# Patient Record
Sex: FEMALE | Race: WHITE | NOT HISPANIC OR LATINO | URBAN - METROPOLITAN AREA
[De-identification: names, ages, dates, MRNs, and addresses within clinical notes are randomized per-mention and may not be internally consistent; named-entity substitution may affect disease eponyms.]

---

## 2017-01-24 ENCOUNTER — OUTPATIENT (OUTPATIENT)
Dept: OUTPATIENT SERVICES | Facility: HOSPITAL | Age: 62
LOS: 1 days | End: 2017-01-24
Payer: COMMERCIAL

## 2017-01-24 ENCOUNTER — APPOINTMENT (OUTPATIENT)
Dept: ULTRASOUND IMAGING | Facility: CLINIC | Age: 62
End: 2017-01-24

## 2017-01-24 ENCOUNTER — APPOINTMENT (OUTPATIENT)
Dept: MAMMOGRAPHY | Facility: CLINIC | Age: 62
End: 2017-01-24

## 2017-01-24 DIAGNOSIS — C50.911 MALIGNANT NEOPLASM OF UNSPECIFIED SITE OF RIGHT FEMALE BREAST: ICD-10-CM

## 2017-01-24 DIAGNOSIS — Z00.00 ENCOUNTER FOR GENERAL ADULT MEDICAL EXAMINATION WITHOUT ABNORMAL FINDINGS: ICD-10-CM

## 2017-01-24 DIAGNOSIS — N63 UNSPECIFIED LUMP IN BREAST: Chronic | ICD-10-CM

## 2017-01-24 DIAGNOSIS — Z98.89 OTHER SPECIFIED POSTPROCEDURAL STATES: Chronic | ICD-10-CM

## 2017-01-24 PROCEDURE — 76641 ULTRASOUND BREAST COMPLETE: CPT

## 2017-01-24 PROCEDURE — 77067 SCR MAMMO BI INCL CAD: CPT

## 2017-01-24 PROCEDURE — 77063 BREAST TOMOSYNTHESIS BI: CPT

## 2017-08-10 ENCOUNTER — EMERGENCY (EMERGENCY)
Facility: HOSPITAL | Age: 62
LOS: 1 days | Discharge: HOME | End: 2017-08-10
Attending: EMERGENCY MEDICINE
Payer: COMMERCIAL

## 2017-08-10 VITALS
DIASTOLIC BLOOD PRESSURE: 80 MMHG | SYSTOLIC BLOOD PRESSURE: 122 MMHG | RESPIRATION RATE: 16 BRPM | HEART RATE: 68 BPM | OXYGEN SATURATION: 99 %

## 2017-08-10 VITALS
HEIGHT: 61 IN | DIASTOLIC BLOOD PRESSURE: 80 MMHG | RESPIRATION RATE: 16 BRPM | SYSTOLIC BLOOD PRESSURE: 126 MMHG | WEIGHT: 167.99 LBS | HEART RATE: 64 BPM | TEMPERATURE: 98 F | OXYGEN SATURATION: 99 %

## 2017-08-10 DIAGNOSIS — Z98.89 OTHER SPECIFIED POSTPROCEDURAL STATES: Chronic | ICD-10-CM

## 2017-08-10 DIAGNOSIS — N63 UNSPECIFIED LUMP IN BREAST: Chronic | ICD-10-CM

## 2017-08-10 LAB
ALBUMIN SERPL ELPH-MCNC: 4.5 G/DL — SIGNIFICANT CHANGE UP (ref 3.3–5.2)
ALP SERPL-CCNC: 76 U/L — SIGNIFICANT CHANGE UP (ref 40–120)
ALT FLD-CCNC: 17 U/L — SIGNIFICANT CHANGE UP
ANION GAP SERPL CALC-SCNC: 10 MMOL/L — SIGNIFICANT CHANGE UP (ref 5–17)
AST SERPL-CCNC: 23 U/L — SIGNIFICANT CHANGE UP
BASOPHILS # BLD AUTO: 0 K/UL — SIGNIFICANT CHANGE UP (ref 0–0.2)
BASOPHILS NFR BLD AUTO: 0.3 % — SIGNIFICANT CHANGE UP (ref 0–2)
BILIRUB SERPL-MCNC: 0.2 MG/DL — LOW (ref 0.4–2)
BUN SERPL-MCNC: 16 MG/DL — SIGNIFICANT CHANGE UP (ref 8–20)
CALCIUM SERPL-MCNC: 9.6 MG/DL — SIGNIFICANT CHANGE UP (ref 8.6–10.2)
CHLORIDE SERPL-SCNC: 100 MMOL/L — SIGNIFICANT CHANGE UP (ref 98–107)
CO2 SERPL-SCNC: 29 MMOL/L — SIGNIFICANT CHANGE UP (ref 22–29)
CREAT SERPL-MCNC: 0.61 MG/DL — SIGNIFICANT CHANGE UP (ref 0.5–1.3)
EOSINOPHIL # BLD AUTO: 0.1 K/UL — SIGNIFICANT CHANGE UP (ref 0–0.5)
EOSINOPHIL NFR BLD AUTO: 1.4 % — SIGNIFICANT CHANGE UP (ref 0–6)
ERYTHROCYTE [SEDIMENTATION RATE] IN BLOOD: 22 MM/HR — HIGH (ref 0–20)
GLUCOSE SERPL-MCNC: 121 MG/DL — HIGH (ref 70–115)
HCT VFR BLD CALC: 38.6 % — SIGNIFICANT CHANGE UP (ref 37–47)
HGB BLD-MCNC: 12.8 G/DL — SIGNIFICANT CHANGE UP (ref 12–16)
LYMPHOCYTES # BLD AUTO: 1.6 K/UL — SIGNIFICANT CHANGE UP (ref 1–4.8)
LYMPHOCYTES # BLD AUTO: 22.9 % — SIGNIFICANT CHANGE UP (ref 20–55)
MCHC RBC-ENTMCNC: 29.7 PG — SIGNIFICANT CHANGE UP (ref 27–31)
MCHC RBC-ENTMCNC: 33.2 G/DL — SIGNIFICANT CHANGE UP (ref 32–36)
MCV RBC AUTO: 89.6 FL — SIGNIFICANT CHANGE UP (ref 81–99)
MONOCYTES # BLD AUTO: 0.4 K/UL — SIGNIFICANT CHANGE UP (ref 0–0.8)
MONOCYTES NFR BLD AUTO: 5.6 % — SIGNIFICANT CHANGE UP (ref 3–10)
NEUTROPHILS # BLD AUTO: 5 K/UL — SIGNIFICANT CHANGE UP (ref 1.8–8)
NEUTROPHILS NFR BLD AUTO: 69.4 % — SIGNIFICANT CHANGE UP (ref 37–73)
PLATELET # BLD AUTO: 253 K/UL — SIGNIFICANT CHANGE UP (ref 150–400)
POTASSIUM SERPL-MCNC: 4.4 MMOL/L — SIGNIFICANT CHANGE UP (ref 3.5–5.3)
POTASSIUM SERPL-SCNC: 4.4 MMOL/L — SIGNIFICANT CHANGE UP (ref 3.5–5.3)
PROT SERPL-MCNC: 7.7 G/DL — SIGNIFICANT CHANGE UP (ref 6.6–8.7)
RBC # BLD: 4.31 M/UL — LOW (ref 4.4–5.2)
RBC # FLD: 13.1 % — SIGNIFICANT CHANGE UP (ref 11–15.6)
SODIUM SERPL-SCNC: 139 MMOL/L — SIGNIFICANT CHANGE UP (ref 135–145)
WBC # BLD: 7.2 K/UL — SIGNIFICANT CHANGE UP (ref 4.8–10.8)
WBC # FLD AUTO: 7.2 K/UL — SIGNIFICANT CHANGE UP (ref 4.8–10.8)

## 2017-08-10 PROCEDURE — 85027 COMPLETE CBC AUTOMATED: CPT

## 2017-08-10 PROCEDURE — 85652 RBC SED RATE AUTOMATED: CPT

## 2017-08-10 PROCEDURE — 99284 EMERGENCY DEPT VISIT MOD MDM: CPT | Mod: 25

## 2017-08-10 PROCEDURE — 93931 UPPER EXTREMITY STUDY: CPT | Mod: 26,RT

## 2017-08-10 PROCEDURE — 80053 COMPREHEN METABOLIC PANEL: CPT

## 2017-08-10 PROCEDURE — 93971 EXTREMITY STUDY: CPT

## 2017-08-10 PROCEDURE — 99284 EMERGENCY DEPT VISIT MOD MDM: CPT

## 2017-08-10 PROCEDURE — 93931 UPPER EXTREMITY STUDY: CPT

## 2017-08-10 PROCEDURE — 93971 EXTREMITY STUDY: CPT | Mod: 26,RT

## 2017-08-10 PROCEDURE — 36415 COLL VENOUS BLD VENIPUNCTURE: CPT

## 2017-08-10 RX ORDER — ASPIRIN/CALCIUM CARB/MAGNESIUM 324 MG
325 TABLET ORAL ONCE
Qty: 0 | Refills: 0 | Status: COMPLETED | OUTPATIENT
Start: 2017-08-10 | End: 2017-08-10

## 2017-08-10 RX ADMIN — Medication 325 MILLIGRAM(S): at 10:01

## 2017-08-10 NOTE — ED ADULT NURSE NOTE - CHIEF COMPLAINT QUOTE
Pt states she was at work when her left middle finger started to throb, numbness, and swelling.  Pt has a hx of arthritis.  Pt felt dizzy when she woke up last night and took some sinus meds today.

## 2017-08-10 NOTE — ED STATDOCS - MUSCULOSKELETAL, MLM
deep red to bluish coloration right 3rd digit from proximal phalanx to distal phalanx more prominent volar aspect deep red to bluish discoloration right 3rd digit from proximal phalanx to distal phalanx more prominent volar aspect

## 2017-08-10 NOTE — CONSULT NOTE ADULT - SUBJECTIVE AND OBJECTIVE BOX
Vascular Attending:  Dr Grimes      HPI: 62 y/o F pt with hx of breast cancer, arthritis, anemia presents to ED c/o sudden onset right 3rd digit swelling, numbness and throbbing sensation while at work this morning. Pt states she was gardening yesterday. No trauma or rash.      PAST MEDICAL & SURGICAL HISTORY:  Gastritis  Anemia  Asthma  Breast CA, right  SBO (small bowel obstruction)  Breast mass: Masectomy On Rt Side  S/P hernia surgery: &amp; Intestinal Surgery      REVIEW OF SYSTEMS  General: denies fever, chills, malaise	  Skin: discolored 3rd finger acutely noted this am  Respiratory and Thorax: denies SOB or cough	  Cardiovascular:	denies CP or palps  Gastrointestinal:	 denies N/V  Musculoskeletal: denies any motor deficits    MEDICATIONS  (STANDING):     MEDICATIONS  (PRN):  Meloxicam    Allergies:  CT Dye-Lips and Tongue Swelling (Other)  Feraheme (Other)    SOCIAL HISTORY: Nonsmoker      Vital Signs Last 24 Hrs  T(C): 36.7 (10 Aug 2017 08:05), Max: 36.7 (10 Aug 2017 08:05)  T(F): 98 (10 Aug 2017 08:05), Max: 98 (10 Aug 2017 08:05)  HR: 64 (10 Aug 2017 08:05) (64 - 64)  BP: 126/80 (10 Aug 2017 08:05) (126/80 - 126/80)  BP(mean): --  RR: 16 (10 Aug 2017 08:05) (16 - 16)  SpO2: 99% (10 Aug 2017 08:05) (99% - 99%)    PHYSICAL EXAM:  Constitutional: WD, WN F in NAd  Respiratory: CTA  Cardiovascular: normal S1, S2 with 2/6 syst murmur  Extremities: RUE warm, well perfused with edematous R 3rd digit, + eccymosis, no temp difference, no motor or sensory deficits. + radial and ulnar pulses    LABS:                        12.8   7.2   )-----------( 253      ( 10 Aug 2017 09:57 )             38.6     08-10    139  |  100  |  16.0  ----------------------------<  121<H>  4.4   |  29.0  |  0.61    Ca    9.6      10 Aug 2017 09:57    TPro  7.7  /  Alb  4.5  /  TBili  0.2<L>  /  DBili  x   /  AST  23  /  ALT  17  /  AlkPhos  76  08-10          RADIOLOGY & ADDITIONAL STUDIES    Impression and Plan: 62 y/o F pt with hx of breast cancer, arthritis, anemia presents to ED c/o sudden onset right 3rd digit swelling and discoloration  Spont hemorrhage into 3rd digit likely  Does not appear embolic with distal tip intact and no evidence for embolic phenomenom  No further workup necessary  Discussed with Dr Grimes Vascular Attending:  Dr Grimes      HPI: 62 y/o F pt with hx of breast cancer, arthritis, anemia presents to ED c/o sudden onset right 3rd digit swelling, numbness and throbbing sensation while at work this morning. Pt states she was gardening yesterday. No trauma or rash.      PAST MEDICAL & SURGICAL HISTORY:  Gastritis  Anemia  Asthma  Breast CA, right  SBO (small bowel obstruction)  Breast mass: Masectomy On Rt Side  S/P hernia surgery: &amp; Intestinal Surgery      REVIEW OF SYSTEMS  General: denies fever, chills, malaise	  Skin: discolored 3rd finger acutely noted this am  Respiratory and Thorax: denies SOB or cough	  Cardiovascular:	denies CP or palps  Gastrointestinal:	 denies N/V  Musculoskeletal: denies any motor deficits    MEDICATIONS  (STANDING):     MEDICATIONS  (PRN):  Meloxicam    Allergies:  CT Dye-Lips and Tongue Swelling (Other)  Feraheme (Other)    SOCIAL HISTORY: Nonsmoker      Vital Signs Last 24 Hrs  T(C): 36.7 (10 Aug 2017 08:05), Max: 36.7 (10 Aug 2017 08:05)  T(F): 98 (10 Aug 2017 08:05), Max: 98 (10 Aug 2017 08:05)  HR: 64 (10 Aug 2017 08:05) (64 - 64)  BP: 126/80 (10 Aug 2017 08:05) (126/80 - 126/80)  BP(mean): --  RR: 16 (10 Aug 2017 08:05) (16 - 16)  SpO2: 99% (10 Aug 2017 08:05) (99% - 99%)    PHYSICAL EXAM:  Constitutional: WD, WN F in NAd  Respiratory: CTA  Cardiovascular: normal S1, S2 with 2/6 syst murmur  Extremities: RUE warm, well perfused with edematous R 3rd digit, + eccymosis, no temp difference, no motor or sensory deficits. + radial and ulnar pulses    LABS:                        12.8   7.2   )-----------( 253      ( 10 Aug 2017 09:57 )             38.6     08-10    139  |  100  |  16.0  ----------------------------<  121<H>  4.4   |  29.0  |  0.61    Ca    9.6      10 Aug 2017 09:57    TPro  7.7  /  Alb  4.5  /  TBili  0.2<L>  /  DBili  x   /  AST  23  /  ALT  17  /  AlkPhos  76  08-10          RADIOLOGY & ADDITIONAL STUDIES    Impression and Plan: 62 y/o F pt with hx of breast cancer, arthritis, anemia presents to ED c/o sudden onset right 3rd digit swelling and discoloration  Spont hemorrhage into 3rd digit likely  Does not appear embolic with distal tip intact, intact pulses and no evidence of ischemia  No further workup necessary  Discussed with Dr Grimes

## 2017-08-10 NOTE — ED STATDOCS - OBJECTIVE STATEMENT
60 y/o F pt with hx of breast cancer, arthritis, anemia presents to ED c/o sudden onset right 3rd digit swelling, numbness and throbbing sensation while at work this morning. Pt states she was gardening yesterday. No rash. No further complaints at this time. 60 y/o F pt with hx of breast cancer, arthritis, anemia presents to ED c/o sudden onset right 3rd digit swelling, numbness and throbbing sensation while at work this morning. Reports symptom onset approx 1 hour before presentation: looked at hand and noted discoloration of finger.  Denies h/o Raynaud's or claudication.  Pt states she was gardening yesterday: denies trauma/injury.   No rash. No further complaints at this time.

## 2017-08-10 NOTE — ED ADULT NURSE NOTE - OBJECTIVE STATEMENT
pt c/o sudden onset of r middle digit swelling, numbness and discoloration. reports no injury just noticed numnbess. now mostly subside, no pain at this time.

## 2017-08-10 NOTE — ED STATDOCS - PROGRESS NOTE DETAILS
Spoke with Dr. RUIZ recommends duplex, CT angio labs and aspirin. Pt with hx of sever contrast reaction, spoke with radiology recommends  selective angio. case discussed with PA for vascular who will see pt in consultation labs and US reviewed, vascular and hand eval appreciated, stable for discharge home US reviewed, stable for d/c home Spoke with [facility]. RUIZ [facility] recommends [facility], CT angio labs and aspirin.

## 2017-08-10 NOTE — ED STATDOCS - ATTENDING CONTRIBUTION TO CARE
I, Leonel Ríos, performed the initial face to face bedside interview with this patient regarding history of present illness, review of symptoms and relevant past medical, social and family history.  I completed an independent physical examination.  I was the provider who initially evaluated this patient.  The history, relevant review of systems, past medical and surgical history, medical decision making, and physical examination was documented by the scribe in my presence and I attest to the accuracy of the documentation. Follow-up on ordered tests (ie labs, radiologic studies) and re-evaluation of the patient's status has been communicated to the ACP.  Disposition of the patient will be based on test outcome and response to ED interventions.

## 2017-09-25 ENCOUNTER — APPOINTMENT (OUTPATIENT)
Dept: UROGYNECOLOGY | Facility: CLINIC | Age: 62
End: 2017-09-25
Payer: COMMERCIAL

## 2017-09-25 VITALS
BODY MASS INDEX: 30.18 KG/M2 | WEIGHT: 164 LBS | SYSTOLIC BLOOD PRESSURE: 140 MMHG | HEIGHT: 62 IN | DIASTOLIC BLOOD PRESSURE: 80 MMHG

## 2017-09-25 DIAGNOSIS — Z87.09 PERSONAL HISTORY OF OTHER DISEASES OF THE RESPIRATORY SYSTEM: ICD-10-CM

## 2017-09-25 DIAGNOSIS — Z82.49 FAMILY HISTORY OF ISCHEMIC HEART DISEASE AND OTHER DISEASES OF THE CIRCULATORY SYSTEM: ICD-10-CM

## 2017-09-25 DIAGNOSIS — Z87.19 PERSONAL HISTORY OF OTHER DISEASES OF THE DIGESTIVE SYSTEM: ICD-10-CM

## 2017-09-25 DIAGNOSIS — Z82.3 FAMILY HISTORY OF STROKE: ICD-10-CM

## 2017-09-25 DIAGNOSIS — Z78.9 OTHER SPECIFIED HEALTH STATUS: ICD-10-CM

## 2017-09-25 DIAGNOSIS — Z80.3 FAMILY HISTORY OF MALIGNANT NEOPLASM OF BREAST: ICD-10-CM

## 2017-09-25 DIAGNOSIS — K46.9 UNSPECIFIED ABDOMINAL HERNIA W/OUT OBSTRUCTION OR GANGRENE: ICD-10-CM

## 2017-09-25 DIAGNOSIS — Z82.5 FAMILY HISTORY OF ASTHMA AND OTHER CHRONIC LOWER RESPIRATORY DISEASES: ICD-10-CM

## 2017-09-25 DIAGNOSIS — Z86.2 PERSONAL HISTORY OF DISEASES OF THE BLOOD AND BLOOD-FORMING ORGANS AND CERTAIN DISORDERS INVOLVING THE IMMUNE MECHANISM: ICD-10-CM

## 2017-09-25 LAB
BILIRUB UR QL STRIP: NEGATIVE
CLARITY UR: CLEAR
COLLECTION METHOD: NORMAL
GLUCOSE UR-MCNC: NEGATIVE
HCG UR QL: 0.2 EU/DL
HGB UR QL STRIP.AUTO: NEGATIVE
KETONES UR-MCNC: NEGATIVE
LEUKOCYTE ESTERASE UR QL STRIP: NEGATIVE
NITRITE UR QL STRIP: NEGATIVE
PH UR STRIP: 5
PROT UR STRIP-MCNC: NEGATIVE
SP GR UR STRIP: 1.01

## 2017-09-25 PROCEDURE — 81003 URINALYSIS AUTO W/O SCOPE: CPT | Mod: QW

## 2017-09-25 PROCEDURE — 51701 INSERT BLADDER CATHETER: CPT

## 2017-09-25 PROCEDURE — 99244 OFF/OP CNSLTJ NEW/EST MOD 40: CPT | Mod: 25

## 2017-09-25 RX ORDER — DEXLANSOPRAZOLE 60 MG/1
60 CAPSULE, DELAYED RELEASE ORAL
Refills: 0 | Status: ACTIVE | COMMUNITY

## 2017-09-26 ENCOUNTER — RESULT REVIEW (OUTPATIENT)
Age: 62
End: 2017-09-26

## 2017-09-27 ENCOUNTER — RESULT REVIEW (OUTPATIENT)
Age: 62
End: 2017-09-27

## 2017-10-12 ENCOUNTER — RX RENEWAL (OUTPATIENT)
Age: 62
End: 2017-10-12

## 2018-01-18 ENCOUNTER — APPOINTMENT (OUTPATIENT)
Dept: UROGYNECOLOGY | Facility: CLINIC | Age: 63
End: 2018-01-18

## 2018-01-18 ENCOUNTER — APPOINTMENT (OUTPATIENT)
Dept: UROGYNECOLOGY | Facility: CLINIC | Age: 63
End: 2018-01-18
Payer: COMMERCIAL

## 2018-01-18 DIAGNOSIS — R35.1 NOCTURIA: ICD-10-CM

## 2018-01-18 PROCEDURE — 99213 OFFICE O/P EST LOW 20 MIN: CPT | Mod: 25

## 2018-01-18 PROCEDURE — 52000 CYSTOURETHROSCOPY: CPT

## 2018-01-31 ENCOUNTER — APPOINTMENT (OUTPATIENT)
Dept: ULTRASOUND IMAGING | Facility: CLINIC | Age: 63
End: 2018-01-31
Payer: COMMERCIAL

## 2018-01-31 ENCOUNTER — OUTPATIENT (OUTPATIENT)
Dept: OUTPATIENT SERVICES | Facility: HOSPITAL | Age: 63
LOS: 1 days | End: 2018-01-31
Payer: COMMERCIAL

## 2018-01-31 ENCOUNTER — APPOINTMENT (OUTPATIENT)
Dept: MAMMOGRAPHY | Facility: CLINIC | Age: 63
End: 2018-01-31
Payer: COMMERCIAL

## 2018-01-31 DIAGNOSIS — Z98.89 OTHER SPECIFIED POSTPROCEDURAL STATES: Chronic | ICD-10-CM

## 2018-01-31 DIAGNOSIS — Z00.00 ENCOUNTER FOR GENERAL ADULT MEDICAL EXAMINATION WITHOUT ABNORMAL FINDINGS: ICD-10-CM

## 2018-01-31 DIAGNOSIS — N63 UNSPECIFIED LUMP IN BREAST: Chronic | ICD-10-CM

## 2018-01-31 PROCEDURE — 77063 BREAST TOMOSYNTHESIS BI: CPT | Mod: 26,52

## 2018-01-31 PROCEDURE — 76641 ULTRASOUND BREAST COMPLETE: CPT

## 2018-01-31 PROCEDURE — 77067 SCR MAMMO BI INCL CAD: CPT

## 2018-01-31 PROCEDURE — 76641 ULTRASOUND BREAST COMPLETE: CPT | Mod: 26,LT

## 2018-01-31 PROCEDURE — 77067 SCR MAMMO BI INCL CAD: CPT | Mod: 26,52,LT

## 2018-01-31 PROCEDURE — 77063 BREAST TOMOSYNTHESIS BI: CPT

## 2018-02-12 ENCOUNTER — OUTPATIENT (OUTPATIENT)
Dept: OUTPATIENT SERVICES | Facility: HOSPITAL | Age: 63
LOS: 1 days | End: 2018-02-12
Payer: COMMERCIAL

## 2018-02-12 DIAGNOSIS — N63 UNSPECIFIED LUMP IN BREAST: Chronic | ICD-10-CM

## 2018-02-12 DIAGNOSIS — Z98.89 OTHER SPECIFIED POSTPROCEDURAL STATES: Chronic | ICD-10-CM

## 2018-02-12 DIAGNOSIS — Z01.818 ENCOUNTER FOR OTHER PREPROCEDURAL EXAMINATION: ICD-10-CM

## 2018-02-12 LAB
ANION GAP SERPL CALC-SCNC: 12 MMOL/L — SIGNIFICANT CHANGE UP (ref 5–17)
BASOPHILS # BLD AUTO: 0 K/UL — SIGNIFICANT CHANGE UP (ref 0–0.2)
BASOPHILS NFR BLD AUTO: 0.3 % — SIGNIFICANT CHANGE UP (ref 0–2)
BUN SERPL-MCNC: 16 MG/DL — SIGNIFICANT CHANGE UP (ref 8–20)
CALCIUM SERPL-MCNC: 9.6 MG/DL — SIGNIFICANT CHANGE UP (ref 8.6–10.2)
CHLORIDE SERPL-SCNC: 98 MMOL/L — SIGNIFICANT CHANGE UP (ref 98–107)
CO2 SERPL-SCNC: 29 MMOL/L — SIGNIFICANT CHANGE UP (ref 22–29)
CREAT SERPL-MCNC: 0.66 MG/DL — SIGNIFICANT CHANGE UP (ref 0.5–1.3)
EOSINOPHIL # BLD AUTO: 0.1 K/UL — SIGNIFICANT CHANGE UP (ref 0–0.5)
EOSINOPHIL NFR BLD AUTO: 1.3 % — SIGNIFICANT CHANGE UP (ref 0–6)
GLUCOSE SERPL-MCNC: 108 MG/DL — SIGNIFICANT CHANGE UP (ref 70–115)
HCT VFR BLD CALC: 40.2 % — SIGNIFICANT CHANGE UP (ref 37–47)
HGB BLD-MCNC: 12.9 G/DL — SIGNIFICANT CHANGE UP (ref 12–16)
LYMPHOCYTES # BLD AUTO: 2.2 K/UL — SIGNIFICANT CHANGE UP (ref 1–4.8)
LYMPHOCYTES # BLD AUTO: 32.2 % — SIGNIFICANT CHANGE UP (ref 20–55)
MCHC RBC-ENTMCNC: 29 PG — SIGNIFICANT CHANGE UP (ref 27–31)
MCHC RBC-ENTMCNC: 32.1 G/DL — SIGNIFICANT CHANGE UP (ref 32–36)
MCV RBC AUTO: 90.3 FL — SIGNIFICANT CHANGE UP (ref 81–99)
MONOCYTES # BLD AUTO: 0.4 K/UL — SIGNIFICANT CHANGE UP (ref 0–0.8)
MONOCYTES NFR BLD AUTO: 5.2 % — SIGNIFICANT CHANGE UP (ref 3–10)
NEUTROPHILS # BLD AUTO: 4.1 K/UL — SIGNIFICANT CHANGE UP (ref 1.8–8)
NEUTROPHILS NFR BLD AUTO: 60.9 % — SIGNIFICANT CHANGE UP (ref 37–73)
PLATELET # BLD AUTO: 242 K/UL — SIGNIFICANT CHANGE UP (ref 150–400)
POTASSIUM SERPL-MCNC: 4 MMOL/L — SIGNIFICANT CHANGE UP (ref 3.5–5.3)
POTASSIUM SERPL-SCNC: 4 MMOL/L — SIGNIFICANT CHANGE UP (ref 3.5–5.3)
RBC # BLD: 4.45 M/UL — SIGNIFICANT CHANGE UP (ref 4.4–5.2)
RBC # FLD: 12.9 % — SIGNIFICANT CHANGE UP (ref 11–15.6)
SODIUM SERPL-SCNC: 139 MMOL/L — SIGNIFICANT CHANGE UP (ref 135–145)
WBC # BLD: 6.7 K/UL — SIGNIFICANT CHANGE UP (ref 4.8–10.8)
WBC # FLD AUTO: 6.7 K/UL — SIGNIFICANT CHANGE UP (ref 4.8–10.8)

## 2018-02-12 PROCEDURE — 80048 BASIC METABOLIC PNL TOTAL CA: CPT

## 2018-02-12 PROCEDURE — 85027 COMPLETE CBC AUTOMATED: CPT

## 2018-02-12 PROCEDURE — G0463: CPT

## 2018-02-12 PROCEDURE — 93010 ELECTROCARDIOGRAM REPORT: CPT

## 2018-02-12 PROCEDURE — 93005 ELECTROCARDIOGRAM TRACING: CPT

## 2018-02-12 PROCEDURE — 36415 COLL VENOUS BLD VENIPUNCTURE: CPT

## 2018-02-16 ENCOUNTER — APPOINTMENT (OUTPATIENT)
Dept: ORTHOPEDIC SURGERY | Facility: CLINIC | Age: 63
End: 2018-02-16
Payer: COMMERCIAL

## 2018-02-16 VITALS
SYSTOLIC BLOOD PRESSURE: 123 MMHG | HEIGHT: 61 IN | HEART RATE: 83 BPM | BODY MASS INDEX: 30.96 KG/M2 | WEIGHT: 164 LBS | DIASTOLIC BLOOD PRESSURE: 80 MMHG

## 2018-02-16 DIAGNOSIS — M25.511 PAIN IN RIGHT SHOULDER: ICD-10-CM

## 2018-02-16 DIAGNOSIS — M19.011 PRIMARY OSTEOARTHRITIS, RIGHT SHOULDER: ICD-10-CM

## 2018-02-16 DIAGNOSIS — M75.51 BURSITIS OF RIGHT SHOULDER: ICD-10-CM

## 2018-02-16 PROCEDURE — 99204 OFFICE O/P NEW MOD 45 MIN: CPT | Mod: 25

## 2018-02-16 PROCEDURE — 20610 DRAIN/INJ JOINT/BURSA W/O US: CPT | Mod: LT

## 2018-02-16 PROCEDURE — 73030 X-RAY EXAM OF SHOULDER: CPT | Mod: RT

## 2018-02-16 RX ORDER — SOLIFENACIN SUCCINATE 5 MG/1
5 TABLET, FILM COATED ORAL DAILY
Qty: 90 | Refills: 0 | Status: DISCONTINUED | COMMUNITY
Start: 2017-10-12 | End: 2018-02-16

## 2018-03-16 ENCOUNTER — OUTPATIENT (OUTPATIENT)
Dept: OUTPATIENT SERVICES | Facility: HOSPITAL | Age: 63
LOS: 1 days | End: 2018-03-16
Payer: COMMERCIAL

## 2018-03-16 DIAGNOSIS — N63 UNSPECIFIED LUMP IN BREAST: Chronic | ICD-10-CM

## 2018-03-16 DIAGNOSIS — Z98.89 OTHER SPECIFIED POSTPROCEDURAL STATES: Chronic | ICD-10-CM

## 2018-03-16 DIAGNOSIS — Z01.818 ENCOUNTER FOR OTHER PREPROCEDURAL EXAMINATION: ICD-10-CM

## 2018-03-16 PROCEDURE — G0463: CPT

## 2018-07-16 ENCOUNTER — RX RENEWAL (OUTPATIENT)
Age: 63
End: 2018-07-16

## 2018-07-19 ENCOUNTER — APPOINTMENT (OUTPATIENT)
Dept: UROGYNECOLOGY | Facility: CLINIC | Age: 63
End: 2018-07-19
Payer: COMMERCIAL

## 2018-07-19 ENCOUNTER — RESULT CHARGE (OUTPATIENT)
Age: 63
End: 2018-07-19

## 2018-07-19 VITALS — DIASTOLIC BLOOD PRESSURE: 60 MMHG | SYSTOLIC BLOOD PRESSURE: 110 MMHG

## 2018-07-19 LAB
BILIRUB UR QL STRIP: NEGATIVE
CLARITY UR: CLEAR
COLLECTION METHOD: NORMAL
GLUCOSE UR-MCNC: NEGATIVE
HCG UR QL: 0.2 EU/DL
HGB UR QL STRIP.AUTO: NORMAL
KETONES UR-MCNC: NEGATIVE
LEUKOCYTE ESTERASE UR QL STRIP: NORMAL
NITRITE UR QL STRIP: NEGATIVE
PH UR STRIP: 5.5
PROT UR STRIP-MCNC: NEGATIVE
SP GR UR STRIP: 1.01

## 2018-07-19 PROCEDURE — 81003 URINALYSIS AUTO W/O SCOPE: CPT | Mod: QW

## 2018-07-19 PROCEDURE — 99213 OFFICE O/P EST LOW 20 MIN: CPT

## 2018-07-19 PROCEDURE — 51798 US URINE CAPACITY MEASURE: CPT

## 2019-01-04 PROBLEM — M19.90 UNSPECIFIED OSTEOARTHRITIS, UNSPECIFIED SITE: Chronic | Status: ACTIVE | Noted: 2017-08-10

## 2019-01-09 ENCOUNTER — OUTPATIENT (OUTPATIENT)
Dept: OUTPATIENT SERVICES | Facility: HOSPITAL | Age: 64
LOS: 1 days | End: 2019-01-09
Payer: COMMERCIAL

## 2019-01-09 VITALS
WEIGHT: 164.91 LBS | RESPIRATION RATE: 16 BRPM | TEMPERATURE: 98 F | HEART RATE: 64 BPM | DIASTOLIC BLOOD PRESSURE: 75 MMHG | HEIGHT: 61 IN | SYSTOLIC BLOOD PRESSURE: 130 MMHG

## 2019-01-09 DIAGNOSIS — Z98.89 OTHER SPECIFIED POSTPROCEDURAL STATES: Chronic | ICD-10-CM

## 2019-01-09 DIAGNOSIS — N63 UNSPECIFIED LUMP IN BREAST: Chronic | ICD-10-CM

## 2019-01-09 DIAGNOSIS — R19.09 OTHER INTRA-ABDOMINAL AND PELVIC SWELLING, MASS AND LUMP: ICD-10-CM

## 2019-01-09 DIAGNOSIS — Z98.82 BREAST IMPLANT STATUS: Chronic | ICD-10-CM

## 2019-01-09 DIAGNOSIS — K43.9 VENTRAL HERNIA WITHOUT OBSTRUCTION OR GANGRENE: ICD-10-CM

## 2019-01-09 DIAGNOSIS — Z01.818 ENCOUNTER FOR OTHER PREPROCEDURAL EXAMINATION: ICD-10-CM

## 2019-01-09 DIAGNOSIS — Z13.89 ENCOUNTER FOR SCREENING FOR OTHER DISORDER: ICD-10-CM

## 2019-01-09 DIAGNOSIS — Z29.9 ENCOUNTER FOR PROPHYLACTIC MEASURES, UNSPECIFIED: ICD-10-CM

## 2019-01-09 LAB
ANION GAP SERPL CALC-SCNC: 12 MMOL/L — SIGNIFICANT CHANGE UP (ref 5–17)
APTT BLD: 34.7 SEC — SIGNIFICANT CHANGE UP (ref 27.5–36.3)
BLD GP AB SCN SERPL QL: SIGNIFICANT CHANGE UP
BUN SERPL-MCNC: 15 MG/DL — SIGNIFICANT CHANGE UP (ref 8–20)
CALCIUM SERPL-MCNC: 9.1 MG/DL — SIGNIFICANT CHANGE UP (ref 8.6–10.2)
CANCER AG125 SERPL-ACNC: 13 U/ML — SIGNIFICANT CHANGE UP
CHLORIDE SERPL-SCNC: 101 MMOL/L — SIGNIFICANT CHANGE UP (ref 98–107)
CO2 SERPL-SCNC: 27 MMOL/L — SIGNIFICANT CHANGE UP (ref 22–29)
CREAT SERPL-MCNC: 0.55 MG/DL — SIGNIFICANT CHANGE UP (ref 0.5–1.3)
GLUCOSE SERPL-MCNC: 86 MG/DL — SIGNIFICANT CHANGE UP (ref 70–115)
HCT VFR BLD CALC: 38.1 % — SIGNIFICANT CHANGE UP (ref 37–47)
HGB BLD-MCNC: 12.3 G/DL — SIGNIFICANT CHANGE UP (ref 12–16)
INR BLD: 1.03 RATIO — SIGNIFICANT CHANGE UP (ref 0.88–1.16)
MCHC RBC-ENTMCNC: 29.6 PG — SIGNIFICANT CHANGE UP (ref 27–31)
MCHC RBC-ENTMCNC: 32.3 G/DL — SIGNIFICANT CHANGE UP (ref 32–36)
MCV RBC AUTO: 91.6 FL — SIGNIFICANT CHANGE UP (ref 81–99)
PLATELET # BLD AUTO: 262 K/UL — SIGNIFICANT CHANGE UP (ref 150–400)
POTASSIUM SERPL-MCNC: 4.1 MMOL/L — SIGNIFICANT CHANGE UP (ref 3.5–5.3)
POTASSIUM SERPL-SCNC: 4.1 MMOL/L — SIGNIFICANT CHANGE UP (ref 3.5–5.3)
PROTHROM AB SERPL-ACNC: 11.9 SEC — SIGNIFICANT CHANGE UP (ref 10–12.9)
RBC # BLD: 4.16 M/UL — LOW (ref 4.4–5.2)
RBC # FLD: 14.1 % — SIGNIFICANT CHANGE UP (ref 11–15.6)
SODIUM SERPL-SCNC: 140 MMOL/L — SIGNIFICANT CHANGE UP (ref 135–145)
WBC # BLD: 5.7 K/UL — SIGNIFICANT CHANGE UP (ref 4.8–10.8)
WBC # FLD AUTO: 5.7 K/UL — SIGNIFICANT CHANGE UP (ref 4.8–10.8)

## 2019-01-09 PROCEDURE — 93010 ELECTROCARDIOGRAM REPORT: CPT

## 2019-01-09 PROCEDURE — 85610 PROTHROMBIN TIME: CPT

## 2019-01-09 PROCEDURE — 85027 COMPLETE CBC AUTOMATED: CPT

## 2019-01-09 PROCEDURE — G0463: CPT

## 2019-01-09 PROCEDURE — 85730 THROMBOPLASTIN TIME PARTIAL: CPT

## 2019-01-09 PROCEDURE — 36415 COLL VENOUS BLD VENIPUNCTURE: CPT

## 2019-01-09 PROCEDURE — 86304 IMMUNOASSAY TUMOR CA 125: CPT

## 2019-01-09 PROCEDURE — 86901 BLOOD TYPING SEROLOGIC RH(D): CPT

## 2019-01-09 PROCEDURE — 86850 RBC ANTIBODY SCREEN: CPT

## 2019-01-09 PROCEDURE — 86900 BLOOD TYPING SEROLOGIC ABO: CPT

## 2019-01-09 PROCEDURE — 93005 ELECTROCARDIOGRAM TRACING: CPT

## 2019-01-09 PROCEDURE — 80048 BASIC METABOLIC PNL TOTAL CA: CPT

## 2019-01-09 RX ORDER — CEFAZOLIN SODIUM 1 G
2000 VIAL (EA) INJECTION ONCE
Qty: 0 | Refills: 0 | Status: DISCONTINUED | OUTPATIENT
Start: 2019-01-17 | End: 2019-02-01

## 2019-01-09 RX ORDER — SODIUM CHLORIDE 9 MG/ML
3 INJECTION INTRAMUSCULAR; INTRAVENOUS; SUBCUTANEOUS ONCE
Qty: 0 | Refills: 0 | Status: DISCONTINUED | OUTPATIENT
Start: 2019-01-17 | End: 2019-01-17

## 2019-01-09 NOTE — H&P PST ADULT - FAMILY HISTORY
Family history of stroke     Father  Still living? No  Family history of hypertension, Age at diagnosis: Age Unknown

## 2019-01-09 NOTE — H&P PST ADULT - PSH
Breast mass  Masectomy On Rt Side  S/P breast reconstruction    S/P hernia surgery  & Intestinal Surgery

## 2019-01-09 NOTE — H&P PST ADULT - PROBLEM SELECTOR PLAN 1
Repair of Right Upper Quadrant, Incisional Hernia, Parietex Mesh and All Related Procedures  Medical Clearance

## 2019-01-09 NOTE — H&P PST ADULT - ASSESSMENT
CAPRINI SCORE [CLOT]    AGE RELATED RISK FACTORS                                                       MOBILITY RELATED FACTORS  [    ] Age 41-60 years                                            (1 Point)                 [     ] Bed rest                                                        (1 Point)  [    ] Age: 61-74 years                                           (2 Points)               [    ] Plaster cast                                                   (2 Points)  [    ] Age= 75 years                                              (3 Points)                 [    ] Bed bound for more than 72 hours                 (2 Points)    DISEASE RELATED RISK FACTORS                                               GENDER SPECIFIC FACTORS  [    ] Edema in the lower extremities                       (1 Point)          [    ] Pregnancy                                                     (1 Point)  [    ] Varicose veins                                               (1 Point)                 [    ] Post-partum < 6 weeks                                   (1 Point)             [    ] BMI > 25 Kg/m2                                            (1 Point)                 [    ] Hormonal therapy  or oral contraception          (1 Point)                 [    ] Sepsis (in the previous month)                        (1 Point)           [    ] History of pregnancy complications                 (1 point)  [    ] Pneumonia or serious lung disease                                           [    ] Unexplained or recurrent                     (1 Point)           (in the previous month)                               (1 Point)  [    ] Abnormal pulmonary function test                     (1 Point)                 SURGERY RELATED RISK FACTORS  [    ] Acute myocardial infarction                              (1 Point)           [    ]  Section                                             (1 Point)  [    ] Congestive heart failure (in the previous month)  (1 Point)  [    ] Minor surgery                                                  (1 Point)   [    ] Inflammatory bowel disease                             (1 Point)           [    ] Arthroscopic surgery                                        (2 Points)  [    ] Central venous access                                      (2 Points)            [    ] General surgery lasting more than 45 minutes   (2 Points)       [    ] Stroke (in the previous month)                          (5 Points)        [    ] Elective arthroplasty                                         (5 Points)                                                                                                                                               HEMATOLOGY RELATED FACTORS                                                 TRAUMA RELATED RISK FACTORS  [    ] Prior episodes of VTE                                     (3 Points)               [    ] Fracture of the hip, pelvis, or leg                       (5 Points)  [    ] Positive family history for VTE                         (3 Points)           [    ] Acute spinal cord injury (in the previous month)  (5 Points)  [    ] Prothrombin 96464 A                                     (3 Points)              [    ] Paralysis  (less than 1 month)                             (5 Points)  [    ] Factor V Leiden                                             (3 Points)                 [    ] Multiple Trauma within 1 month                        (5 Points)  [    ] Lupus anticoagulants                                     (3 Points)                                                           [    ] Anticardiolipin antibodies                               (3 Points)                                                       [    ] High homocysteine in the blood                      (3 Points)                                             [ ] Other congenital or acquired thrombophilia      (3 Points)                                                [ ] Heparin induced thrombocytopenia                  (3 Points)                                          Total Score [          ]    OPIOID RISK TOOL    [place] EACH BOX THAT APPLIES AND ADD TOTALS AT THE END    FAMILY HISTORY OF SUBSTANCE ABUSE                 FEMALE         MALE                                                Alcohol                            [    ] 1 pt         [     ] 3pts                                               Illegal Drugs                    [    ] 2 pts       [     ] 3pts                                               Rx Drugs                          [      ]4 pts      [     ] 4 pts    PERSONAL HISTORY OF SUBSTANCE ABUSE                                                                                          Alcohol                            [     ] 3 pts       [     ] 3 pts                                               Illegal Drugs                    [     ] 4 pts       [     ] 4 pts                                               Rx Drugs                          [     ] 5 pts       [     ] 5 pts    AGE BETWEEN 16-45 YEARS                                     [     ] 1 pt         [     ] 1 pt    HISTORY OF PREADOLESCENT   SEXUAL ABUSE                                                            [     ] 3 pts        [     ] 0pts    PSYCHOLOGICAL DISEASE                     ADD, OCD, Bipolar, Schizophrenia        [     ] 2 pts        [     ] 2 pts                      Depression                                               [     ] 1 pt          [     ] 1 pt           SCORING TOTAL   (add numbers and type here)              (      )                                     A score of 3 or lower indicated LOW risk for future opioid abuse  A score of 4 to 7 indicated moderate risk for future opioid abuse  A score of 8 or higher indicates a high risk for opioid abuse CAPRINI SCORE [CLOT]    AGE RELATED RISK FACTORS                                                       MOBILITY RELATED FACTORS  [    ] Age 41-60 years                                            (1 Point)                 [     ] Bed rest                                                        (1 Point)  [ x   ] Age: 61-74 years                                           (2 Points)               [    ] Plaster cast                                                   (2 Points)  [    ] Age= 75 years                                              (3 Points)                 [    ] Bed bound for more than 72 hours                 (2 Points)    DISEASE RELATED RISK FACTORS                                               GENDER SPECIFIC FACTORS  [    ] Edema in the lower extremities                       (1 Point)          [    ] Pregnancy                                                     (1 Point)  [    ] Varicose veins                                               (1 Point)                 [    ] Post-partum < 6 weeks                                   (1 Point)             [   x ] BMI > 25 Kg/m2                                            (1 Point)                 [    ] Hormonal therapy  or oral contraception          (1 Point)                 [    ] Sepsis (in the previous month)                        (1 Point)           [    ] History of pregnancy complications                 (1 point)  [    ] Pneumonia or serious lung disease                                           [    ] Unexplained or recurrent                     (1 Point)           (in the previous month)                               (1 Point)  [    ] Abnormal pulmonary function test                     (1 Point)                 SURGERY RELATED RISK FACTORS  [    ] Acute myocardial infarction                              (1 Point)           [    ]  Section                                             (1 Point)  [    ] Congestive heart failure (in the previous month)  (1 Point)  [    ] Minor surgery                                                  (1 Point)   [    ] Inflammatory bowel disease                             (1 Point)           [    ] Arthroscopic surgery                                        (2 Points)  [    ] Central venous access                                      (2 Points)            [   x ] General surgery lasting more than 45 minutes   (2 Points)       [    ] Stroke (in the previous month)                          (5 Points)        [    ] Elective arthroplasty                                         (5 Points)                                                                                                                                               HEMATOLOGY RELATED FACTORS                                                 TRAUMA RELATED RISK FACTORS  [    ] Prior episodes of VTE                                     (3 Points)               [    ] Fracture of the hip, pelvis, or leg                       (5 Points)  [    ] Positive family history for VTE                         (3 Points)           [    ] Acute spinal cord injury (in the previous month)  (5 Points)  [    ] Prothrombin 02101 A                                     (3 Points)              [    ] Paralysis  (less than 1 month)                             (5 Points)  [    ] Factor V Leiden                                             (3 Points)                 [    ] Multiple Trauma within 1 month                        (5 Points)  [    ] Lupus anticoagulants                                     (3 Points)                                                           [    ] Anticardiolipin antibodies                               (3 Points)                                                       [    ] High homocysteine in the blood                      (3 Points)                                             [ ] Other congenital or acquired thrombophilia      (3 Points)                                                [ ] Heparin induced thrombocytopenia                  (3 Points)                                          Total Score [     5     ]    OPIOID RISK TOOL    [place] EACH BOX THAT APPLIES AND ADD TOTALS AT THE END    FAMILY HISTORY OF SUBSTANCE ABUSE                 FEMALE         MALE                                                Alcohol                            [    ] 1 pt         [     ] 3pts                                               Illegal Drugs                    [    ] 2 pts       [     ] 3pts                                               Rx Drugs                          [      ]4 pts      [     ] 4 pts    PERSONAL HISTORY OF SUBSTANCE ABUSE                                                                                          Alcohol                            [     ] 3 pts       [     ] 3 pts                                               Illegal Drugs                    [     ] 4 pts       [     ] 4 pts                                               Rx Drugs                          [     ] 5 pts       [     ] 5 pts    AGE BETWEEN 16-45 YEARS                                     [     ] 1 pt         [     ] 1 pt    HISTORY OF PREADOLESCENT   SEXUAL ABUSE                                                            [     ] 3 pts        [     ] 0pts    PSYCHOLOGICAL DISEASE                     ADD, OCD, Bipolar, Schizophrenia        [     ] 2 pts        [     ] 2 pts                      Depression                                               [     ] 1 pt          [     ] 1 pt           SCORING TOTAL   (add numbers and type here)              (  0    )                                     A score of 3 or lower indicated LOW risk for future opioid abuse  A score of 4 to 7 indicated moderate risk for future opioid abuse  A score of 8 or higher indicates a high risk for opioid abuse

## 2019-01-09 NOTE — H&P PST ADULT - HISTORY OF PRESENT ILLNESS
This is a 63 y.o female who presents to PST today.  The pt reports she noticed a hernia to her right upper quadrant.  She was evaluated by a surgeon and had a CT performed.  She is now scheduled for hernia surgery in the near future.

## 2019-01-14 ENCOUNTER — APPOINTMENT (OUTPATIENT)
Dept: UROGYNECOLOGY | Facility: CLINIC | Age: 64
End: 2019-01-14
Payer: COMMERCIAL

## 2019-01-14 VITALS
BODY MASS INDEX: 30.96 KG/M2 | DIASTOLIC BLOOD PRESSURE: 72 MMHG | SYSTOLIC BLOOD PRESSURE: 111 MMHG | WEIGHT: 164 LBS | HEIGHT: 61 IN

## 2019-01-14 PROCEDURE — 51798 US URINE CAPACITY MEASURE: CPT

## 2019-01-14 PROCEDURE — 99213 OFFICE O/P EST LOW 20 MIN: CPT

## 2019-01-14 NOTE — HISTORY OF PRESENT ILLNESS
[FreeTextEntry1] : This 64 yo woman presents with a history if some SIMON, + UUI, urgency, frequency and nocturia X 1-2 who has been taking VESIcare 5 mg since January 2018. A cysto done on 1/18/18 was negative She also has a history of Right Mastectomy.  She reports that she is happy with the outcome of this treatment.   \par

## 2019-01-14 NOTE — DISCUSSION/SUMMARY
[FreeTextEntry1] : Will extend Rx for VESIcare 5 mg for 1 year.  Rx VESIcare 5 mg 1 PO OD X 90 days with 3 refills.  Discussed difficulties raieing her grandson at length.  RTC X 1 year or PRN.

## 2019-01-16 ENCOUNTER — TRANSCRIPTION ENCOUNTER (OUTPATIENT)
Age: 64
End: 2019-01-16

## 2019-01-17 ENCOUNTER — OUTPATIENT (OUTPATIENT)
Dept: INPATIENT UNIT | Facility: HOSPITAL | Age: 64
LOS: 1 days | End: 2019-01-17
Payer: COMMERCIAL

## 2019-01-17 ENCOUNTER — RESULT REVIEW (OUTPATIENT)
Age: 64
End: 2019-01-17

## 2019-01-17 VITALS
DIASTOLIC BLOOD PRESSURE: 84 MMHG | SYSTOLIC BLOOD PRESSURE: 131 MMHG | HEART RATE: 83 BPM | RESPIRATION RATE: 16 BRPM | OXYGEN SATURATION: 98 %

## 2019-01-17 VITALS
RESPIRATION RATE: 16 BRPM | TEMPERATURE: 99 F | OXYGEN SATURATION: 100 % | DIASTOLIC BLOOD PRESSURE: 74 MMHG | SYSTOLIC BLOOD PRESSURE: 134 MMHG | HEIGHT: 61 IN | WEIGHT: 164.91 LBS | HEART RATE: 83 BPM

## 2019-01-17 DIAGNOSIS — K43.9 VENTRAL HERNIA WITHOUT OBSTRUCTION OR GANGRENE: ICD-10-CM

## 2019-01-17 DIAGNOSIS — N63 UNSPECIFIED LUMP IN BREAST: Chronic | ICD-10-CM

## 2019-01-17 DIAGNOSIS — Z98.89 OTHER SPECIFIED POSTPROCEDURAL STATES: Chronic | ICD-10-CM

## 2019-01-17 DIAGNOSIS — Z98.82 BREAST IMPLANT STATUS: Chronic | ICD-10-CM

## 2019-01-17 PROCEDURE — 88304 TISSUE EXAM BY PATHOLOGIST: CPT | Mod: 26

## 2019-01-17 PROCEDURE — C1781: CPT

## 2019-01-17 PROCEDURE — 49561: CPT

## 2019-01-17 PROCEDURE — 88304 TISSUE EXAM BY PATHOLOGIST: CPT

## 2019-01-17 PROCEDURE — 49568: CPT

## 2019-01-17 RX ORDER — BUPIVACAINE 13.3 MG/ML
20 INJECTION, SUSPENSION, LIPOSOMAL INFILTRATION ONCE
Qty: 0 | Refills: 0 | Status: DISCONTINUED | OUTPATIENT
Start: 2019-01-17 | End: 2019-01-17

## 2019-01-17 RX ORDER — SODIUM CHLORIDE 9 MG/ML
1000 INJECTION, SOLUTION INTRAVENOUS
Qty: 0 | Refills: 0 | Status: DISCONTINUED | OUTPATIENT
Start: 2019-01-17 | End: 2019-01-17

## 2019-01-17 RX ORDER — FENTANYL CITRATE 50 UG/ML
50 INJECTION INTRAVENOUS
Qty: 0 | Refills: 0 | Status: DISCONTINUED | OUTPATIENT
Start: 2019-01-17 | End: 2019-01-17

## 2019-01-17 RX ORDER — ONDANSETRON 8 MG/1
4 TABLET, FILM COATED ORAL ONCE
Qty: 0 | Refills: 0 | Status: DISCONTINUED | OUTPATIENT
Start: 2019-01-17 | End: 2019-01-17

## 2019-01-17 RX ADMIN — FENTANYL CITRATE 50 MICROGRAM(S): 50 INJECTION INTRAVENOUS at 10:36

## 2019-01-17 RX ADMIN — FENTANYL CITRATE 50 MICROGRAM(S): 50 INJECTION INTRAVENOUS at 10:13

## 2019-01-17 RX ADMIN — FENTANYL CITRATE 50 MICROGRAM(S): 50 INJECTION INTRAVENOUS at 10:26

## 2019-01-17 RX ADMIN — FENTANYL CITRATE 50 MICROGRAM(S): 50 INJECTION INTRAVENOUS at 11:09

## 2019-01-17 NOTE — ASU DISCHARGE PLAN (ADULT/PEDIATRIC). - MEDICATION SUMMARY - MEDICATIONS TO TAKE
I will START or STAY ON the medications listed below when I get home from the hospital:    Vicodin 5 mg-300 mg oral tablet  -- 1 tab(s) by mouth every 6 hours MDD:4  -- Caution federal law prohibits the transfer of this drug to any person other  than the person for whom it was prescribed.  Do not drink alcoholic beverages when taking this medication.  May cause drowsiness.  Alcohol may intensify this effect.  Use care when operating dangerous machinery.  This drug may impair the ability to drive or operate machinery.  Use care until you become familiar with its effects.  This product contains acetaminophen.  Do not use  with any other product containing acetaminophen to prevent possible liver damage.  Using more of this medication than prescribed may cause serious breathing problems.    -- Indication: For pain med    Viibryd 20 mg oral tablet  -- 1 tab(s) by mouth once a day  -- Indication: For home med    Dexilant 60 mg oral delayed release capsule  -- 1 cap(s) by mouth once a day  -- Indication: For home med    VESIcare 5 mg oral tablet  -- 1 tab(s) by mouth once a day  -- Indication: For home med

## 2019-01-17 NOTE — BRIEF OPERATIVE NOTE - PROCEDURE
<<-----Click on this checkbox to enter Procedure Herniorrhaphy of incarcerated incisional hernia with insertion of mesh  01/17/2019  lysis of multiple dense complex adhesions 30 minutes partial omentectomy  Active  [email or web address]

## 2019-01-17 NOTE — BRIEF OPERATIVE NOTE - POST-OP DX
Incisional hernia, incarcerated  01/17/2019  multiple dense complex adhesions ,incarcerated omentum  Active  Simone Moreno

## 2019-01-23 LAB — SURGICAL PATHOLOGY STUDY: SIGNIFICANT CHANGE UP

## 2019-04-03 ENCOUNTER — APPOINTMENT (OUTPATIENT)
Dept: MAMMOGRAPHY | Facility: CLINIC | Age: 64
End: 2019-04-03
Payer: COMMERCIAL

## 2019-04-03 ENCOUNTER — APPOINTMENT (OUTPATIENT)
Dept: ULTRASOUND IMAGING | Facility: CLINIC | Age: 64
End: 2019-04-03
Payer: COMMERCIAL

## 2019-04-03 ENCOUNTER — OUTPATIENT (OUTPATIENT)
Dept: OUTPATIENT SERVICES | Facility: HOSPITAL | Age: 64
LOS: 1 days | End: 2019-04-03
Payer: COMMERCIAL

## 2019-04-03 DIAGNOSIS — Z98.89 OTHER SPECIFIED POSTPROCEDURAL STATES: Chronic | ICD-10-CM

## 2019-04-03 DIAGNOSIS — R92.8 OTHER ABNORMAL AND INCONCLUSIVE FINDINGS ON DIAGNOSTIC IMAGING OF BREAST: ICD-10-CM

## 2019-04-03 DIAGNOSIS — Z98.82 BREAST IMPLANT STATUS: Chronic | ICD-10-CM

## 2019-04-03 DIAGNOSIS — N63 UNSPECIFIED LUMP IN BREAST: Chronic | ICD-10-CM

## 2019-04-03 PROCEDURE — 77067 SCR MAMMO BI INCL CAD: CPT | Mod: 26,LT,52

## 2019-04-03 PROCEDURE — 76641 ULTRASOUND BREAST COMPLETE: CPT | Mod: 26,LT

## 2019-04-03 PROCEDURE — 77063 BREAST TOMOSYNTHESIS BI: CPT

## 2019-04-03 PROCEDURE — 76641 ULTRASOUND BREAST COMPLETE: CPT

## 2019-04-03 PROCEDURE — 77063 BREAST TOMOSYNTHESIS BI: CPT | Mod: 26,52

## 2019-04-03 PROCEDURE — 77067 SCR MAMMO BI INCL CAD: CPT

## 2019-05-30 ENCOUNTER — APPOINTMENT (OUTPATIENT)
Dept: RADIOLOGY | Facility: CLINIC | Age: 64
End: 2019-05-30

## 2019-05-30 ENCOUNTER — OUTPATIENT (OUTPATIENT)
Dept: OUTPATIENT SERVICES | Facility: HOSPITAL | Age: 64
LOS: 1 days | End: 2019-05-30
Payer: COMMERCIAL

## 2019-05-30 DIAGNOSIS — Z98.82 BREAST IMPLANT STATUS: Chronic | ICD-10-CM

## 2019-05-30 DIAGNOSIS — Z98.89 OTHER SPECIFIED POSTPROCEDURAL STATES: Chronic | ICD-10-CM

## 2019-05-30 DIAGNOSIS — N63 UNSPECIFIED LUMP IN BREAST: Chronic | ICD-10-CM

## 2019-05-30 DIAGNOSIS — Z00.00 ENCOUNTER FOR GENERAL ADULT MEDICAL EXAMINATION WITHOUT ABNORMAL FINDINGS: ICD-10-CM

## 2019-05-30 PROCEDURE — 72110 X-RAY EXAM L-2 SPINE 4/>VWS: CPT

## 2019-05-30 PROCEDURE — 72110 X-RAY EXAM L-2 SPINE 4/>VWS: CPT | Mod: 26

## 2019-06-18 ENCOUNTER — OUTPATIENT (OUTPATIENT)
Dept: OUTPATIENT SERVICES | Facility: HOSPITAL | Age: 64
LOS: 1 days | End: 2019-06-18
Payer: COMMERCIAL

## 2019-06-18 ENCOUNTER — APPOINTMENT (OUTPATIENT)
Dept: MRI IMAGING | Facility: CLINIC | Age: 64
End: 2019-06-18
Payer: COMMERCIAL

## 2019-06-18 DIAGNOSIS — Z00.8 ENCOUNTER FOR OTHER GENERAL EXAMINATION: ICD-10-CM

## 2019-06-18 DIAGNOSIS — N63 UNSPECIFIED LUMP IN BREAST: Chronic | ICD-10-CM

## 2019-06-18 DIAGNOSIS — Z98.82 BREAST IMPLANT STATUS: Chronic | ICD-10-CM

## 2019-06-18 DIAGNOSIS — Z98.89 OTHER SPECIFIED POSTPROCEDURAL STATES: Chronic | ICD-10-CM

## 2019-06-18 PROCEDURE — 72148 MRI LUMBAR SPINE W/O DYE: CPT

## 2019-06-18 PROCEDURE — 72148 MRI LUMBAR SPINE W/O DYE: CPT | Mod: 26

## 2019-07-01 PROBLEM — M54.5 LUMBAGO: Status: ACTIVE | Noted: 2019-07-01

## 2019-07-02 ENCOUNTER — APPOINTMENT (OUTPATIENT)
Dept: ORTHOPEDIC SURGERY | Facility: CLINIC | Age: 64
End: 2019-07-02
Payer: COMMERCIAL

## 2019-07-02 VITALS
SYSTOLIC BLOOD PRESSURE: 136 MMHG | HEART RATE: 71 BPM | WEIGHT: 164 LBS | BODY MASS INDEX: 30.96 KG/M2 | HEIGHT: 61 IN | DIASTOLIC BLOOD PRESSURE: 81 MMHG

## 2019-07-02 DIAGNOSIS — S32.000A WEDGE COMPRESSION FRACTURE OF UNSPECIFIED LUMBAR VERTEBRA, INITIAL ENCOUNTER FOR CLOSED FRACTURE: ICD-10-CM

## 2019-07-02 DIAGNOSIS — M54.5 LOW BACK PAIN: ICD-10-CM

## 2019-07-02 DIAGNOSIS — Z85.3 PERSONAL HISTORY OF MALIGNANT NEOPLASM OF BREAST: ICD-10-CM

## 2019-07-02 DIAGNOSIS — S32.010A WEDGE COMPRESSION FRACTURE OF FIRST LUMBAR VERTEBRA, INITIAL ENCOUNTER FOR CLOSED FRACTURE: ICD-10-CM

## 2019-07-02 PROCEDURE — 22310 CLOSED TX VERT FX W/O MANJ: CPT

## 2019-07-02 PROCEDURE — 72100 X-RAY EXAM L-S SPINE 2/3 VWS: CPT

## 2019-07-02 PROCEDURE — 99214 OFFICE O/P EST MOD 30 MIN: CPT

## 2019-07-02 PROCEDURE — 99204 OFFICE O/P NEW MOD 45 MIN: CPT | Mod: 57

## 2019-07-02 NOTE — PHYSICAL EXAM
[Poor Appearance] : well-appearing [Acute Distress] : not in acute distress [de-identified] : CONSTITUTIONAL: The patient is a very pleasant individual who is well-nourished and who appears stated age.\par PSYCHIATRIC: The patient is alert and oriented X 3 and in no apparent distress, and participates with orthopedic evaluation well.\par HEAD: Atraumatic and is nonsyndromic in appearance.\par EENT: No visible thyromegaly, EOMI.\par RESPIRATORY: Respiratory rate is regular, not dyspneic on examination.\par LYMPHATICS: There is no inguinal lymphadenopathy\par INTEGUMENTARY: Skin is clean, dry, and intact about the bilateral lower extremities and lumbar spine.\par VASCULAR: There is brisk capillary refill about the bilateral lower extremities.\par NEUROLOGIC: There are no pathologic reflexes. There is no decrease in sensation of the bilateral lower extremities on Wartenberg pinwheel examination. Deep tendon reflexes are well maintained at 2+/4 of the bilateral lower extremities and are symmetric.\par MUSCULOSKELETAL: There is no visible muscular atrophy. Manual motor strength is well maintained in the bilateral lower extremities. Range of motion of lumbar spine is well maintained. The patient ambulates in a non-myelopathic manner. Negative tension sign and straight leg raise bilaterally. Quad extension, ankle dorsiflexion, EHL, plantar flexion, and ankle eversion are well preserved. Normal secondary orthopaedic exam of bilateral hips, greater trochanteric area, knees and ankles \par \par TTP lower lumbar region as well as mild Mechanically orientated pain. 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 Mild L4-L5 spondylolisthesis. \par \par MRI of the lumbar spine from 6/2019 shows T12 and L1 compression fx's. Mild L4-L5 spondylolisthesis.

## 2019-07-02 NOTE — HISTORY OF PRESENT ILLNESS
[de-identified] : 63 year old F presents with lumbar spine pain for the last few weeks. Pt states her back pain started when using wheel [place] moving dirt around her house. Isolates pain to lower lumbar region with mild pain at thoracolumbar junction. SHOAIB questionnaire is negative.  [Ataxia] : no ataxia [Loss of Dexterity] : good dexterity [Incontinence] : no incontinence [Urinary Ret.] : no urinary retention

## 2019-07-02 NOTE — DISCUSSION/SUMMARY
[de-identified] : Pt is doing very well with pain well controlled. She is gainfully employed at this point in time. Continue conservative fx care management. Start soft tissue modalities under PT. The patient will follow up in 1 month for a repeat clinical assessment.

## 2019-07-02 NOTE — ADDENDUM
[FreeTextEntry1] : Documented by Joe Gillespie acting as a scribe for Dr. Justyn Michel on 07/02/2019 . All medical record entries made by the Scribe were at my, Dr. Justyn Michel, direction and personally dictated by me on 07/02/2019 . I have reviewed the chart and agree that the record accurately reflects my personal performance of the history, physical exam, assessment and plan. I have also personally directed, reviewed, and agreed with the chart.

## 2019-07-17 ENCOUNTER — EMERGENCY (EMERGENCY)
Facility: HOSPITAL | Age: 64
LOS: 1 days | Discharge: HOME | End: 2019-07-17
Attending: EMERGENCY MEDICINE
Payer: COMMERCIAL

## 2019-07-17 VITALS
DIASTOLIC BLOOD PRESSURE: 76 MMHG | OXYGEN SATURATION: 88 % | HEIGHT: 61 IN | WEIGHT: 160.06 LBS | HEART RATE: 101 BPM | TEMPERATURE: 100 F | RESPIRATION RATE: 18 BRPM | SYSTOLIC BLOOD PRESSURE: 134 MMHG

## 2019-07-17 VITALS
TEMPERATURE: 99 F | RESPIRATION RATE: 19 BRPM | OXYGEN SATURATION: 93 % | SYSTOLIC BLOOD PRESSURE: 139 MMHG | HEART RATE: 93 BPM | DIASTOLIC BLOOD PRESSURE: 76 MMHG

## 2019-07-17 DIAGNOSIS — Z98.82 BREAST IMPLANT STATUS: Chronic | ICD-10-CM

## 2019-07-17 DIAGNOSIS — N63 UNSPECIFIED LUMP IN BREAST: Chronic | ICD-10-CM

## 2019-07-17 DIAGNOSIS — Z98.89 OTHER SPECIFIED POSTPROCEDURAL STATES: Chronic | ICD-10-CM

## 2019-07-17 PROCEDURE — 96374 THER/PROPH/DIAG INJ IV PUSH: CPT

## 2019-07-17 PROCEDURE — 99284 EMERGENCY DEPT VISIT MOD MDM: CPT | Mod: 25

## 2019-07-17 PROCEDURE — 99284 EMERGENCY DEPT VISIT MOD MDM: CPT

## 2019-07-17 PROCEDURE — 96375 TX/PRO/DX INJ NEW DRUG ADDON: CPT

## 2019-07-17 RX ORDER — FAMOTIDINE 10 MG/ML
20 INJECTION INTRAVENOUS ONCE
Refills: 0 | Status: COMPLETED | OUTPATIENT
Start: 2019-07-17 | End: 2019-07-17

## 2019-07-17 RX ORDER — KETOROLAC TROMETHAMINE 30 MG/ML
15 SYRINGE (ML) INJECTION ONCE
Refills: 0 | Status: DISCONTINUED | OUTPATIENT
Start: 2019-07-17 | End: 2019-07-17

## 2019-07-17 RX ADMIN — FAMOTIDINE 20 MILLIGRAM(S): 10 INJECTION INTRAVENOUS at 20:55

## 2019-07-17 RX ADMIN — Medication 15 MILLIGRAM(S): at 20:55

## 2019-07-17 NOTE — ED ADULT NURSE NOTE - OBJECTIVE STATEMENT
patient was doing yard work and was attacked by bees, was stung multiple times including in the groin and legs. patient went to [facility] and was given benadryl, decadron, and atrovent nebulizer. patient c/o burning at the site of stings. denies Shortness of breath or swelling of tongue at this time.

## 2019-07-17 NOTE — ED ADULT NURSE NOTE - NSIMPLEMENTINTERV_GEN_ALL_ED
Implemented All Universal Safety Interventions:  [place] to call system. Call bell, personal items and telephone within reach. Instruct patient to call for assistance. Room bathroom lighting operational. Non-slip footwear when patient is off stretcher. Physically safe environment: no spills, clutter or unnecessary equipment. Stretcher in lowest position, wheels locked, appropriate side rails in place.

## 2019-07-17 NOTE — ED PROVIDER NOTE - ENMT, MLM
Airway patent, Nasal mucosa clear. Mouth with normal mucosa. Throat has no vesicles, no oropharyngeal exudates and uvula is midline. Airway patent, Nasal mucosa clear. Mouth with normal mucosa. NO drooling. NO swelling of tongue or lips.

## 2019-07-17 NOTE — ED PROVIDER NOTE - CLINICAL SUMMARY MEDICAL DECISION MAKING FREE TEXT BOX
64 y/o female present with acute inflammatory response to bee sting onset 2 hours ago. No signs of anaphylactic shock.  Pt c/o burning pain to her feet, ankle, knees and nose. Pt received Benadryl and Decadron PTA at urgent care. 64 y/o female present with acute inflammatory response to bee sting onset 2 hours ago. No signs of anaphylactic shock.  Pt c/o burning pain to her feet, ankle, knees and nose. Pt received Benadryl and Decadron PTA at urgent care. Will give Toradol and Pepcid for symptomatic relief and continue to monitor.

## 2019-07-17 NOTE — ED ADULT TRIAGE NOTE - CHIEF COMPLAINT QUOTE
Pt states "I was stung by multiple bees and my legs and crotch are on fire", pt took 50mg benadryl at home and rec'd decadron and albuterol at urgent care, pt denies difficulty breathing/swallowing or itchy throat, states "feeling better after the meds, I was all flush before but now it's just burning"

## 2019-07-17 NOTE — ED PROVIDER NOTE - OBJECTIVE STATEMENT
62 y/o female presented with burning pain to her BLE, R breat, nose and arms onset 2 hours ago s/p pt was stung by bees. Pt reports that she was stung by wood bees two years ago and her tongue was swollen. Pt went to urgent care where she received Benadryl and Decadron with relief of sx's. However, pt was referred to come to ED due to the concern for her breathing. Pt was noted to be tachycardic and hypoxic. Currently, pt denies any SOB, tongue swelling, lip swelling, N/V, HA, fever, chills, or any other sx's. Pt is allergic to IV iron. Pt denies smoking, drinking or drugs.

## 2019-07-17 NOTE — ED PROVIDER NOTE - SKIN, MLM
Skin normal color for race, warm, dry and intact. multiple areas of erythema to bilat feet, ankles and nose, consistent with bee sting

## 2019-08-06 ENCOUNTER — MEDICATION RENEWAL (OUTPATIENT)
Age: 64
End: 2019-08-06

## 2019-08-06 ENCOUNTER — INPATIENT (INPATIENT)
Facility: HOSPITAL | Age: 64
LOS: 7 days | Discharge: HOME | DRG: 390 | End: 2019-08-14
Attending: FAMILY MEDICINE | Admitting: INTERNAL MEDICINE
Payer: COMMERCIAL

## 2019-08-06 VITALS
HEART RATE: 79 BPM | RESPIRATION RATE: 18 BRPM | SYSTOLIC BLOOD PRESSURE: 143 MMHG | OXYGEN SATURATION: 98 % | WEIGHT: 164.91 LBS | HEIGHT: 62 IN | DIASTOLIC BLOOD PRESSURE: 72 MMHG | TEMPERATURE: 98 F

## 2019-08-06 DIAGNOSIS — N63 UNSPECIFIED LUMP IN BREAST: Chronic | ICD-10-CM

## 2019-08-06 DIAGNOSIS — Z98.82 BREAST IMPLANT STATUS: Chronic | ICD-10-CM

## 2019-08-06 DIAGNOSIS — Z98.89 OTHER SPECIFIED POSTPROCEDURAL STATES: Chronic | ICD-10-CM

## 2019-08-06 LAB
ALBUMIN SERPL ELPH-MCNC: 4.5 G/DL — SIGNIFICANT CHANGE UP (ref 3.3–5.2)
ALP SERPL-CCNC: 73 U/L — SIGNIFICANT CHANGE UP (ref 40–120)
ALT FLD-CCNC: 18 U/L — SIGNIFICANT CHANGE UP
ANION GAP SERPL CALC-SCNC: 13 MMOL/L — SIGNIFICANT CHANGE UP (ref 5–17)
APTT BLD: 35.8 SEC — SIGNIFICANT CHANGE UP (ref 27.5–36.3)
AST SERPL-CCNC: 21 U/L — SIGNIFICANT CHANGE UP
BASOPHILS # BLD AUTO: 0.04 K/UL — SIGNIFICANT CHANGE UP (ref 0–0.2)
BASOPHILS NFR BLD AUTO: 0.3 % — SIGNIFICANT CHANGE UP (ref 0–2)
BILIRUB SERPL-MCNC: 0.2 MG/DL — LOW (ref 0.4–2)
BUN SERPL-MCNC: 16 MG/DL — SIGNIFICANT CHANGE UP (ref 8–20)
CALCIUM SERPL-MCNC: 9.9 MG/DL — SIGNIFICANT CHANGE UP (ref 8.6–10.2)
CHLORIDE SERPL-SCNC: 100 MMOL/L — SIGNIFICANT CHANGE UP (ref 98–107)
CO2 SERPL-SCNC: 27 MMOL/L — SIGNIFICANT CHANGE UP (ref 22–29)
CREAT SERPL-MCNC: 0.81 MG/DL — SIGNIFICANT CHANGE UP (ref 0.5–1.3)
EOSINOPHIL # BLD AUTO: 0.09 K/UL — SIGNIFICANT CHANGE UP (ref 0–0.5)
EOSINOPHIL NFR BLD AUTO: 0.7 % — SIGNIFICANT CHANGE UP (ref 0–6)
GLUCOSE SERPL-MCNC: 120 MG/DL — HIGH (ref 70–115)
HCT VFR BLD CALC: 40.2 % — SIGNIFICANT CHANGE UP (ref 34.5–45)
HGB BLD-MCNC: 12.7 G/DL — SIGNIFICANT CHANGE UP (ref 11.5–15.5)
IMM GRANULOCYTES NFR BLD AUTO: 0.6 % — SIGNIFICANT CHANGE UP (ref 0–1.5)
INR BLD: 0.98 RATIO — SIGNIFICANT CHANGE UP (ref 0.88–1.16)
LACTATE BLDV-MCNC: 1 MMOL/L — SIGNIFICANT CHANGE UP (ref 0.5–2)
LIDOCAIN IGE QN: 24 U/L — SIGNIFICANT CHANGE UP (ref 22–51)
LYMPHOCYTES # BLD AUTO: 1.13 K/UL — SIGNIFICANT CHANGE UP (ref 1–3.3)
LYMPHOCYTES # BLD AUTO: 8.3 % — LOW (ref 13–44)
MAGNESIUM SERPL-MCNC: 1.9 MG/DL — SIGNIFICANT CHANGE UP (ref 1.6–2.6)
MCHC RBC-ENTMCNC: 28.9 PG — SIGNIFICANT CHANGE UP (ref 27–34)
MCHC RBC-ENTMCNC: 31.6 GM/DL — LOW (ref 32–36)
MCV RBC AUTO: 91.6 FL — SIGNIFICANT CHANGE UP (ref 80–100)
MONOCYTES # BLD AUTO: 0.6 K/UL — SIGNIFICANT CHANGE UP (ref 0–0.9)
MONOCYTES NFR BLD AUTO: 4.4 % — SIGNIFICANT CHANGE UP (ref 2–14)
NEUTROPHILS # BLD AUTO: 11.71 K/UL — HIGH (ref 1.8–7.4)
NEUTROPHILS NFR BLD AUTO: 85.7 % — HIGH (ref 43–77)
PLATELET # BLD AUTO: 252 K/UL — SIGNIFICANT CHANGE UP (ref 150–400)
POTASSIUM SERPL-MCNC: 3.6 MMOL/L — SIGNIFICANT CHANGE UP (ref 3.5–5.3)
POTASSIUM SERPL-SCNC: 3.6 MMOL/L — SIGNIFICANT CHANGE UP (ref 3.5–5.3)
PROT SERPL-MCNC: 7.8 G/DL — SIGNIFICANT CHANGE UP (ref 6.6–8.7)
PROTHROM AB SERPL-ACNC: 11.3 SEC — SIGNIFICANT CHANGE UP (ref 10–12.9)
RBC # BLD: 4.39 M/UL — SIGNIFICANT CHANGE UP (ref 3.8–5.2)
RBC # FLD: 13 % — SIGNIFICANT CHANGE UP (ref 10.3–14.5)
SODIUM SERPL-SCNC: 140 MMOL/L — SIGNIFICANT CHANGE UP (ref 135–145)
WBC # BLD: 13.65 K/UL — HIGH (ref 3.8–10.5)
WBC # FLD AUTO: 13.65 K/UL — HIGH (ref 3.8–10.5)

## 2019-08-06 PROCEDURE — 99285 EMERGENCY DEPT VISIT HI MDM: CPT

## 2019-08-06 RX ORDER — ONDANSETRON 8 MG/1
8 TABLET, FILM COATED ORAL ONCE
Refills: 0 | Status: COMPLETED | OUTPATIENT
Start: 2019-08-06 | End: 2019-08-06

## 2019-08-06 RX ORDER — MORPHINE SULFATE 50 MG/1
6 CAPSULE, EXTENDED RELEASE ORAL ONCE
Refills: 0 | Status: DISCONTINUED | OUTPATIENT
Start: 2019-08-06 | End: 2019-08-06

## 2019-08-06 RX ORDER — FAMOTIDINE 10 MG/ML
20 INJECTION INTRAVENOUS ONCE
Refills: 0 | Status: COMPLETED | OUTPATIENT
Start: 2019-08-06 | End: 2019-08-06

## 2019-08-06 RX ORDER — SODIUM CHLORIDE 9 MG/ML
1000 INJECTION INTRAMUSCULAR; INTRAVENOUS; SUBCUTANEOUS ONCE
Refills: 0 | Status: COMPLETED | OUTPATIENT
Start: 2019-08-06 | End: 2019-08-06

## 2019-08-06 RX ADMIN — FAMOTIDINE 20 MILLIGRAM(S): 10 INJECTION INTRAVENOUS at 23:08

## 2019-08-06 RX ADMIN — SODIUM CHLORIDE 2000 MILLILITER(S): 9 INJECTION INTRAMUSCULAR; INTRAVENOUS; SUBCUTANEOUS at 22:57

## 2019-08-06 RX ADMIN — ONDANSETRON 8 MILLIGRAM(S): 8 TABLET, FILM COATED ORAL at 22:57

## 2019-08-06 RX ADMIN — MORPHINE SULFATE 6 MILLIGRAM(S): 50 CAPSULE, EXTENDED RELEASE ORAL at 22:57

## 2019-08-06 NOTE — ED ADULT NURSE NOTE - OBJECTIVE STATEMENT
Pt. presents AxOx4 to ED complaining of 8/10 abdominal pain with associated nausea. Pt. denies vomiting, diarrhea. Pt. states her pain started today, "it feels like I have an obstruction." Pt. states last BM was today "but I had nuts and raisins yesterday, which I'm not supposed to have." Pt. denies bloody stool. Pt. has complex abdominal hx with multiple hernia repair surgeries in LL[place], pt has mesh placed in surgery along entire lower abdomen from 12 years ago. Pt. states she "always has a bulge" in LLQ however "today it looks bigger."

## 2019-08-06 NOTE — ED PROVIDER NOTE - PROGRESS NOTE DETAILS
no pain at this time feeling better consult placed to [facility] surgical assoicates awaiting callback. am attending aware of plan of care spoke with dr washington coming to evaluate pt this am , pt updated

## 2019-08-06 NOTE — ED ADULT TRIAGE NOTE - CHIEF COMPLAINT QUOTE
I think I have a bowel obstruction, I've had them before, I am having severe intermittent abd pain on the left side of my stomach with nausea since 1700, denies any other symptoms

## 2019-08-06 NOTE — ED ADULT NURSE REASSESSMENT NOTE - NS ED NURSE REASSESS COMMENT FT1
received report from DANIA Silva. pt transported to [facility] with  at bedside. pt states that she is feeling better after morphine. pt appears to be in no acute distress at this time. pt awaiting ct scan. will continue to monitor.

## 2019-08-06 NOTE — ED PROVIDER NOTE - OBJECTIVE STATEMENT
64 y/o F pt with hx of small bowel obstruction presents to ED c/o sudden onset left sided abdominal pain associated with nausea and nonbloody bilious emesis since 17:00. Pt states unable to pass gas. Pt states symptoms are similar to prior obstructions. Denies fever. denies HA or neck pain. no chest pain or sob. no diarrhea. no urinary f/u/d. no back pain. no motor or sensory deficits. denies illicit drug use. no recent travel. no rash. no other acute issues symptoms or concerns

## 2019-08-06 NOTE — ED ADULT NURSE NOTE - CHPI ED NUR SYMPTOMS NEG
no dysuria/no hematuria/no abdominal distension/no blood in stool/no burning urination/no chills/no vomiting/no diarrhea/no fever

## 2019-08-07 DIAGNOSIS — K56.609 UNSPECIFIED INTESTINAL OBSTRUCTION, UNSPECIFIED AS TO PARTIAL VERSUS COMPLETE OBSTRUCTION: ICD-10-CM

## 2019-08-07 LAB — BLD GP AB SCN SERPL QL: SIGNIFICANT CHANGE UP

## 2019-08-07 PROCEDURE — 71045 X-RAY EXAM CHEST 1 VIEW: CPT | Mod: 26

## 2019-08-07 PROCEDURE — 99223 1ST HOSP IP/OBS HIGH 75: CPT

## 2019-08-07 PROCEDURE — 74176 CT ABD & PELVIS W/O CONTRAST: CPT | Mod: 26

## 2019-08-07 RX ORDER — MORPHINE SULFATE 50 MG/1
4 CAPSULE, EXTENDED RELEASE ORAL ONCE
Refills: 0 | Status: DISCONTINUED | OUTPATIENT
Start: 2019-08-07 | End: 2019-08-07

## 2019-08-07 RX ORDER — ONDANSETRON 8 MG/1
4 TABLET, FILM COATED ORAL EVERY 4 HOURS
Refills: 0 | Status: DISCONTINUED | OUTPATIENT
Start: 2019-08-07 | End: 2019-08-14

## 2019-08-07 RX ORDER — MORPHINE SULFATE 50 MG/1
6 CAPSULE, EXTENDED RELEASE ORAL ONCE
Refills: 0 | Status: DISCONTINUED | OUTPATIENT
Start: 2019-08-07 | End: 2019-08-07

## 2019-08-07 RX ORDER — PANTOPRAZOLE SODIUM 20 MG/1
40 TABLET, DELAYED RELEASE ORAL ONCE
Refills: 0 | Status: COMPLETED | OUTPATIENT
Start: 2019-08-07 | End: 2019-08-07

## 2019-08-07 RX ORDER — MORPHINE SULFATE 50 MG/1
2 CAPSULE, EXTENDED RELEASE ORAL EVERY 6 HOURS
Refills: 0 | Status: DISCONTINUED | OUTPATIENT
Start: 2019-08-07 | End: 2019-08-14

## 2019-08-07 RX ORDER — SODIUM CHLORIDE 9 MG/ML
1000 INJECTION, SOLUTION INTRAVENOUS
Refills: 0 | Status: DISCONTINUED | OUTPATIENT
Start: 2019-08-07 | End: 2019-08-11

## 2019-08-07 RX ORDER — MORPHINE SULFATE 50 MG/1
4 CAPSULE, EXTENDED RELEASE ORAL EVERY 6 HOURS
Refills: 0 | Status: DISCONTINUED | OUTPATIENT
Start: 2019-08-07 | End: 2019-08-14

## 2019-08-07 RX ORDER — PANTOPRAZOLE SODIUM 20 MG/1
40 TABLET, DELAYED RELEASE ORAL
Refills: 0 | Status: DISCONTINUED | OUTPATIENT
Start: 2019-08-07 | End: 2019-08-14

## 2019-08-07 RX ORDER — ONDANSETRON 8 MG/1
4 TABLET, FILM COATED ORAL ONCE
Refills: 0 | Status: COMPLETED | OUTPATIENT
Start: 2019-08-07 | End: 2019-08-07

## 2019-08-07 RX ORDER — BENZOCAINE 10 %
1 GEL (GRAM) MUCOUS MEMBRANE EVERY 6 HOURS
Refills: 0 | Status: DISCONTINUED | OUTPATIENT
Start: 2019-08-07 | End: 2019-08-14

## 2019-08-07 RX ORDER — ACETAMINOPHEN 500 MG
650 TABLET ORAL EVERY 6 HOURS
Refills: 0 | Status: DISCONTINUED | OUTPATIENT
Start: 2019-08-07 | End: 2019-08-08

## 2019-08-07 RX ORDER — METOCLOPRAMIDE HCL 10 MG
10 TABLET ORAL ONCE
Refills: 0 | Status: COMPLETED | OUTPATIENT
Start: 2019-08-07 | End: 2019-08-07

## 2019-08-07 RX ORDER — METOCLOPRAMIDE HCL 10 MG
10 TABLET ORAL EVERY 6 HOURS
Refills: 0 | Status: DISCONTINUED | OUTPATIENT
Start: 2019-08-07 | End: 2019-08-14

## 2019-08-07 RX ADMIN — MORPHINE SULFATE 4 MILLIGRAM(S): 50 CAPSULE, EXTENDED RELEASE ORAL at 12:45

## 2019-08-07 RX ADMIN — Medication 104 MILLIGRAM(S): at 02:27

## 2019-08-07 RX ADMIN — PANTOPRAZOLE SODIUM 40 MILLIGRAM(S): 20 TABLET, DELAYED RELEASE ORAL at 19:12

## 2019-08-07 RX ADMIN — MORPHINE SULFATE 4 MILLIGRAM(S): 50 CAPSULE, EXTENDED RELEASE ORAL at 19:05

## 2019-08-07 RX ADMIN — MORPHINE SULFATE 6 MILLIGRAM(S): 50 CAPSULE, EXTENDED RELEASE ORAL at 08:39

## 2019-08-07 RX ADMIN — SODIUM CHLORIDE 100 MILLILITER(S): 9 INJECTION, SOLUTION INTRAVENOUS at 08:07

## 2019-08-07 RX ADMIN — MORPHINE SULFATE 4 MILLIGRAM(S): 50 CAPSULE, EXTENDED RELEASE ORAL at 19:13

## 2019-08-07 RX ADMIN — ONDANSETRON 4 MILLIGRAM(S): 8 TABLET, FILM COATED ORAL at 07:34

## 2019-08-07 RX ADMIN — MORPHINE SULFATE 4 MILLIGRAM(S): 50 CAPSULE, EXTENDED RELEASE ORAL at 02:27

## 2019-08-07 RX ADMIN — MORPHINE SULFATE 4 MILLIGRAM(S): 50 CAPSULE, EXTENDED RELEASE ORAL at 13:00

## 2019-08-07 RX ADMIN — Medication 10 MILLIGRAM(S): at 18:34

## 2019-08-07 RX ADMIN — PANTOPRAZOLE SODIUM 40 MILLIGRAM(S): 20 TABLET, DELAYED RELEASE ORAL at 07:38

## 2019-08-07 RX ADMIN — MORPHINE SULFATE 4 MILLIGRAM(S): 50 CAPSULE, EXTENDED RELEASE ORAL at 07:35

## 2019-08-07 RX ADMIN — ONDANSETRON 4 MILLIGRAM(S): 8 TABLET, FILM COATED ORAL at 10:56

## 2019-08-07 RX ADMIN — ONDANSETRON 4 MILLIGRAM(S): 8 TABLET, FILM COATED ORAL at 16:52

## 2019-08-07 RX ADMIN — MORPHINE SULFATE 6 MILLIGRAM(S): 50 CAPSULE, EXTENDED RELEASE ORAL at 00:32

## 2019-08-07 RX ADMIN — SODIUM CHLORIDE 100 MILLILITER(S): 9 INJECTION, SOLUTION INTRAVENOUS at 09:21

## 2019-08-07 RX ADMIN — MORPHINE SULFATE 2 MILLIGRAM(S): 50 CAPSULE, EXTENDED RELEASE ORAL at 20:08

## 2019-08-07 NOTE — CONSULT NOTE ADULT - SUBJECTIVE AND OBJECTIVE BOX
63 year old female presenting with 1 day history of sharp, lower abdominal pain. The pain was not associated with food intake and does not radiate anywhere. She last passed flatus yesterday and had a normal, solid bowel movement last night as well. Started to continue having worsening abdominal pain associated with nausea and multiple episodes of emesis. Denies fevers, chest pain, palpitations, dysuria, blood per rectum     PMH: depression, urinary incontinence  PSH: right mastectomy axillary lymph node dissection with TRAM flap reconstruction, multiple ventral hernia surgeries with mesh. last hernia repair in january 2019 by Dr. Moreno  Medications: dexilant 60mg daily, vesicare 5mg daily, viibryd 20mg daily  Allergies: iron, IV contrast  FH: noncontributory  SH: denies tobacco or illicit drug use. drinks alcohol socially        MEDICATIONS  (STANDING):  lactated ringers. 1000 milliLiter(s) (100 mL/Hr) IV Continuous <Continuous>  pantoprazole  Injectable 40 milliGRAM(s) IV Push two times a day    MEDICATIONS  (PRN):  acetaminophen    Suspension .. 650 milliGRAM(s) Oral every 6 hours PRN Temp greater or equal to 38C (100.4F)  benzocaine 20% Spray 1 Spray(s) Topical every 6 hours PRN throat irritation  morphine  - Injectable 2 milliGRAM(s) IV Push every 6 hours PRN Mod-severe pain 4-10  morphine  - Injectable 4 milliGRAM(s) IV Push every 6 hours PRN breakthrough pain  ondansetron Injectable 4 milliGRAM(s) IV Push every 4 hours PRN Nausea and/or Vomiting      Vital Signs Last 24 Hrs  T(C): 36.5 (07 Aug 2019 06:29), Max: 36.7 (07 Aug 2019 02:19)  T(F): 97.7 (07 Aug 2019 06:29), Max: 98 (07 Aug 2019 02:19)  HR: 77 (07 Aug 2019 06:29) (77 - 81)  BP: 112/68 (07 Aug 2019 06:29) (112/68 - 143/72)  BP(mean): --  RR: 18 (07 Aug 2019 06:29) (18 - 18)  SpO2: 95% (07 Aug 2019 06:29) (95% - 98%)    Physical Exam:    General: no acute distress, AOx3  Respiratory: nonlabored breathing, no use of accessory muscles  Cardiovascular: Regular rate & rhythm, normal S1, S2; no murmurs, gallops or rubs  Gastrointestinal: Soft, mildly distended, tender in lower abdomen. no rebound tenderness, guarding or rigidity. abdominal incisions healed well   MATEUS: deferred  Extremities: No peripheral edema, No cyanosis, clubbing   Vascular: Equal and normal pulses: 2+ peripheral pulses throughout  Musculoskeletal: No joint pain, swelling or deformity; no limitation of movement  Skin: No rashes      I&O's Detail      LABS:                        12.7   13.65 )-----------( 252      ( 06 Aug 2019 22:53 )             40.2     08-06    140  |  100  |  16.0  ----------------------------<  120<H>  3.6   |  27.0  |  0.81    Ca    9.9      06 Aug 2019 22:53  Mg     1.9     08-06    TPro  7.8  /  Alb  4.5  /  TBili  0.2<L>  /  DBili  x   /  AST  21  /  ALT  18  /  AlkPhos  73  08-06    PT/INR - ( 06 Aug 2019 22:53 )   PT: 11.3 sec;   INR: 0.98 ratio         PTT - ( 06 Aug 2019 22:53 )  PTT:35.8 sec      RADIOLOGY & ADDITIONAL STUDIES:  8/7/19 CT abd/pelvis: Dilated small bowel measuring up to 3.8 cm to the level of a   transition point in the left lower quadrant distal to fecalized loops   with administered oral contrast transiting beyond this transition point   into the distal small bowel and colon compatible with a partial small   bowel obstruction. Moderate-sized hiatal hernia. Sigmoid diverticulosis   without diverticulitis. Appendix is unremarkable. 63 year old female presenting with 1 day history of sharp, lower abdominal pain. The pain was not associated with food intake and does not radiate anywhere. She last passed flatus yesterday and had a normal, solid bowel movement last night as well. Started to continue having worsening abdominal pain associated with nausea and multiple episodes of emesis. Denies fevers, chest pain, palpitations, dysuria, blood per rectum     PMH: depression, urinary incontinence, hiatal hernia  PSH: right mastectomy axillary lymph node dissection with TRAM flap reconstruction, multiple ventral hernia surgeries with mesh. last hernia repair in january 2019 by Dr. Moreno  Medications: dexilant 60mg daily, vesicare 5mg daily, viibryd 20mg daily  Allergies: iron, IV contrast  FH: noncontributory  SH: denies tobacco or illicit drug use. drinks alcohol socially        MEDICATIONS  (STANDING):  lactated ringers. 1000 milliLiter(s) (100 mL/Hr) IV Continuous <Continuous>  pantoprazole  Injectable 40 milliGRAM(s) IV Push two times a day    MEDICATIONS  (PRN):  acetaminophen    Suspension .. 650 milliGRAM(s) Oral every 6 hours PRN Temp greater or equal to 38C (100.4F)  benzocaine 20% Spray 1 Spray(s) Topical every 6 hours PRN throat irritation  morphine  - Injectable 2 milliGRAM(s) IV Push every 6 hours PRN Mod-severe pain 4-10  morphine  - Injectable 4 milliGRAM(s) IV Push every 6 hours PRN breakthrough pain  ondansetron Injectable 4 milliGRAM(s) IV Push every 4 hours PRN Nausea and/or Vomiting      Vital Signs Last 24 Hrs  T(C): 36.5 (07 Aug 2019 06:29), Max: 36.7 (07 Aug 2019 02:19)  T(F): 97.7 (07 Aug 2019 06:29), Max: 98 (07 Aug 2019 02:19)  HR: 77 (07 Aug 2019 06:29) (77 - 81)  BP: 112/68 (07 Aug 2019 06:29) (112/68 - 143/72)  BP(mean): --  RR: 18 (07 Aug 2019 06:29) (18 - 18)  SpO2: 95% (07 Aug 2019 06:29) (95% - 98%)    Physical Exam:    General: no acute distress, AOx3  Respiratory: nonlabored breathing, no use of accessory muscles  Cardiovascular: Regular rate & rhythm, normal S1, S2; no murmurs, gallops or rubs  Gastrointestinal: Soft, mildly distended, tender in lower abdomen. no rebound tenderness, guarding or rigidity. abdominal incisions healed well   MATEUS: deferred  Extremities: No peripheral edema, No cyanosis, clubbing   Vascular: Equal and normal pulses: 2+ peripheral pulses throughout  Musculoskeletal: No joint pain, swelling or deformity; no limitation of movement  Skin: No rashes      I&O's Detail      LABS:                        12.7   13.65 )-----------( 252      ( 06 Aug 2019 22:53 )             40.2     08-06    140  |  100  |  16.0  ----------------------------<  120<H>  3.6   |  27.0  |  0.81    Ca    9.9      06 Aug 2019 22:53  Mg     1.9     08-06    TPro  7.8  /  Alb  4.5  /  TBili  0.2<L>  /  DBili  x   /  AST  21  /  ALT  18  /  AlkPhos  73  08-06    PT/INR - ( 06 Aug 2019 22:53 )   PT: 11.3 sec;   INR: 0.98 ratio         PTT - ( 06 Aug 2019 22:53 )  PTT:35.8 sec      RADIOLOGY & ADDITIONAL STUDIES:  8/7/19 CT abd/pelvis: Dilated small bowel measuring up to 3.8 cm to the level of a   transition point in the left lower quadrant distal to fecalized loops   with administered oral contrast transiting beyond this transition point   into the distal small bowel and colon compatible with a partial small   bowel obstruction. Moderate-sized hiatal hernia. Sigmoid diverticulosis   without diverticulitis. Appendix is unremarkable.

## 2019-08-07 NOTE — ED ADULT NURSE REASSESSMENT NOTE - NS ED NURSE REASSESS COMMENT FT1
pt ambulating to bathroom independently with steady gait noted. pt c/o slight discomfort, requesting pain medication and nausea medication. MD ohara made aware, awaiting orders.

## 2019-08-07 NOTE — ED ADULT NURSE REASSESSMENT NOTE - NS ED NURSE REASSESS COMMENT FT1
received patient A&Ox3, resp wnl, rx for nausea & pain and reflux . Dr Moreno attended saw patient , no surgery as of now, will monitor,   IV LR @ 100/hr infusing well

## 2019-08-07 NOTE — H&P ADULT - HISTORY OF PRESENT ILLNESS
63yof w/ pmh sbo, multiple hernia repairs sp mesh + TRAM flap procedure, breast ca sp RT mastectomy, oa, gerd, mild intermittent asthma presenting w/ abd pain. pt noted symptoms to begin spontaneously around 430PM day prior to admit, prior to that time no symptoms at all. states she had breakfast w/o issue. noted that during lunchtime had onset of abd pain which progressively worsening o/n interfereing w/ her ability to rest. pt w/ hx of multiple sbo in the past, initially did not feel symptoms were consistent w/ prior ep but with time became worried about worsening and presented to ed where ct confirmed partial obstruction. denies fever, chills, cp, palpitations, sob, dyspnea, admitted to nausea, no ep of emesis prior to admit but x1 in hospital, no diarrhea/constipation. admits to passing flatus and belching.

## 2019-08-07 NOTE — CONSULT NOTE ADULT - ASSESSMENT
63 year old female with extensive abdominal surgery presenting with signs and symptoms of small bowel obstruction. Hemodynamically stable with abdominal exam consistent with an obstruction. no bowel function since yesterday    -NPO/IV fluids  -NGT to continuous low wall suction for proximal decompression  -monitor for bowel function  -serial abdominal exams  -rest of care as per primary team   -surgical team to follow

## 2019-08-07 NOTE — ED ADULT NURSE REASSESSMENT NOTE - NS ED NURSE REASSESS COMMENT FT1
pt c/o pain. MD ohara made aware. awaiting pain medication orders. pt wanting to know when she will be going for ct. ct called, one other patient to go before her. pt made aware.

## 2019-08-07 NOTE — H&P ADULT - NSHPPHYSICALEXAM_GEN_ALL_CORE
T(C): 36.7 (08-08-19 @ 07:20), Max: 37.6 (08-07-19 @ 14:13)  HR: 86 (08-08-19 @ 07:20) (74 - 87)  BP: 106/66 (08-08-19 @ 07:20) (106/66 - 145/63)  RR: 18 (08-08-19 @ 07:20) (16 - 22)  SpO2: 95% (08-08-19 @ 07:20) (93% - 97%)    GEN - appears age appropriate. well nourished. mild distress.   HEENT - NCAT, EOMI, SANAZ  RESP - CTA BL, no wheeze/stridor/rhonchi/crackles. not on supplemental O2.  CARDIO - NS1S2, RRR. No murmurs/rubs/gallops.  ABD - Soft/moderately tender/midlly distended. Normal BS x4 quadrants. +guarding + rebound, LLQ especially w/ tenderness  Ext - No KATHYA. no signs of venous/arterial stasis ulcers  MSK - full ROM of BL upper and lower extremities without pain or restriction. BL 5/5 strength on upper and lower extremities.   Neuro - cn 2-12 grossly intact. no visible seizure activity appreciated. no tremor. gait not observed.   Skin - clean, dry, intact. no rashes or lesions.    Psych- AAOx3. no suicidal/homicidal ideation. appropriate behaviour. attentive. normal affect.

## 2019-08-07 NOTE — H&P ADULT - NSICDXPASTSURGICALHX_GEN_ALL_CORE_FT
PAST SURGICAL HISTORY:  Breast mass Masectomy On Rt Side    S/P breast reconstruction     S/P hernia surgery & Intestinal Surgery

## 2019-08-07 NOTE — ED ADULT NURSE REASSESSMENT NOTE - NS ED NURSE REASSESS COMMENT FT1
MD Ohara at bedside, discussing ct results and plan of care with pt and . pt aware of plan of care, awaiting sx consult as per MD ohara. pt denies any pain or nausea at this time. pt states "I feel a lot better now".

## 2019-08-07 NOTE — H&P ADULT - NSICDXPASTMEDICALHX_GEN_ALL_CORE_FT
PAST MEDICAL HISTORY:  Anemia     Arthritis     Asthma     Breast CA, right     Gastritis     SBO (small bowel obstruction)

## 2019-08-07 NOTE — H&P ADULT - ASSESSMENT
#partial sbo  -confirmed on ctap w/ con, along w/ sigmoid diverticulosis w/o diverticulitis + mod sized hiatal hernia  -seen by sx Dr Moreno, pending full cs  -npo, ivf, serial electrolytes w/ repletion prn  -serial abd exam    IMPROVE VTE Individual Risk Assessment    RISK                                                                Points    [  ] Previous VTE                                                  3    [  ] Thrombophilia                                               2    [  ] Lower limb paralysis                                      2        (unable to hold up >15 seconds)      [  ] Current Cancer                                              2         (within 6 months)    [  ] Immobilization > 24 hrs                                1    [  ] ICU/CCU stay > 24 hours                              1    [  ] Age > 60                                                      1    IMPROVE VTE Score _________    IMPROVE Score 0-1: Low Risk, No VTE prophylaxis required for most patients, encourage ambulation.   IMPROVE Score 2-3: At risk, pharmacologic VTE prophylaxis is indicated for most patients (in the absence of a contraindication)  IMPROVE Score > or = 4: High Risk, pharmacologic VTE prophylaxis is indicated for most patients (in the absence of a contraindication)    #dispo. dc home when obstruction resolved, undetermined date    #outpt fu. pmd, sx Dr Moreno 63yof w/ pmh sbo, multiple hernia repairs sp mesh + TRAM flap procedure, breast ca sp RT mastectomy, oa, gerd, mild intermittent asthma presenting w/ abd pain admitted for partial sbo.    #partial sbo  -confirmed on ctap w/ con, along w/ sigmoid diverticulosis w/o diverticulitis + mod sized hiatal hernia  -seen by sx Dr Moreno, pending full cs  -npo, ivf, serial electrolytes w/ repletion prn  -prn pain control  -serial abd exam  -abd xr in am    IMPROVE VTE Individual Risk Assessment    RISK                                                                Points    [  ] Previous VTE                                                  3    [  ] Thrombophilia                                               2    [  ] Lower limb paralysis                                      2        (unable to hold up >15 seconds)      [  ] Current Cancer                                              2         (within 6 months)    [  ] Immobilization > 24 hrs                                1    [  ] ICU/CCU stay > 24 hours                              1    [ x ] Age > 60                                                      1    IMPROVE VTE Score ___1_____    IMPROVE Score 0-1: Low Risk, No VTE prophylaxis required for most patients, encourage ambulation.   IMPROVE Score 2-3: At risk, pharmacologic VTE prophylaxis is indicated for most patients (in the absence of a contraindication)  IMPROVE Score > or = 4: High Risk, pharmacologic VTE prophylaxis is indicated for most patients (in the absence of a contraindication)    #dispo. dc home when obstruction resolved, undetermined date    #outpt fu. pmd, sx Dr Moreno

## 2019-08-08 DIAGNOSIS — K56.609 UNSPECIFIED INTESTINAL OBSTRUCTION, UNSPECIFIED AS TO PARTIAL VERSUS COMPLETE OBSTRUCTION: ICD-10-CM

## 2019-08-08 DIAGNOSIS — N39.3 STRESS INCONTINENCE (FEMALE) (MALE): ICD-10-CM

## 2019-08-08 DIAGNOSIS — K21.9 GASTRO-ESOPHAGEAL REFLUX DISEASE WITHOUT ESOPHAGITIS: ICD-10-CM

## 2019-08-08 LAB
ANION GAP SERPL CALC-SCNC: 12 MMOL/L — SIGNIFICANT CHANGE UP (ref 5–17)
BASOPHILS # BLD AUTO: 0.01 K/UL — SIGNIFICANT CHANGE UP (ref 0–0.2)
BASOPHILS NFR BLD AUTO: 0.1 % — SIGNIFICANT CHANGE UP (ref 0–2)
BUN SERPL-MCNC: 10 MG/DL — SIGNIFICANT CHANGE UP (ref 8–20)
CALCIUM SERPL-MCNC: 9.1 MG/DL — SIGNIFICANT CHANGE UP (ref 8.6–10.2)
CHLORIDE SERPL-SCNC: 100 MMOL/L — SIGNIFICANT CHANGE UP (ref 98–107)
CO2 SERPL-SCNC: 25 MMOL/L — SIGNIFICANT CHANGE UP (ref 22–29)
CREAT SERPL-MCNC: 0.47 MG/DL — LOW (ref 0.5–1.3)
EOSINOPHIL # BLD AUTO: 0.02 K/UL — SIGNIFICANT CHANGE UP (ref 0–0.5)
EOSINOPHIL NFR BLD AUTO: 0.3 % — SIGNIFICANT CHANGE UP (ref 0–6)
GLUCOSE SERPL-MCNC: 110 MG/DL — SIGNIFICANT CHANGE UP (ref 70–115)
HCT VFR BLD CALC: 36.3 % — SIGNIFICANT CHANGE UP (ref 34.5–45)
HCV AB S/CO SERPL IA: 0.14 S/CO — SIGNIFICANT CHANGE UP (ref 0–0.99)
HCV AB SERPL-IMP: SIGNIFICANT CHANGE UP
HGB BLD-MCNC: 11.7 G/DL — SIGNIFICANT CHANGE UP (ref 11.5–15.5)
IMM GRANULOCYTES NFR BLD AUTO: 0.3 % — SIGNIFICANT CHANGE UP (ref 0–1.5)
LYMPHOCYTES # BLD AUTO: 0.82 K/UL — LOW (ref 1–3.3)
LYMPHOCYTES # BLD AUTO: 11.3 % — LOW (ref 13–44)
MAGNESIUM SERPL-MCNC: 2 MG/DL — SIGNIFICANT CHANGE UP (ref 1.6–2.6)
MCHC RBC-ENTMCNC: 29.5 PG — SIGNIFICANT CHANGE UP (ref 27–34)
MCHC RBC-ENTMCNC: 32.2 GM/DL — SIGNIFICANT CHANGE UP (ref 32–36)
MCV RBC AUTO: 91.4 FL — SIGNIFICANT CHANGE UP (ref 80–100)
MONOCYTES # BLD AUTO: 0.43 K/UL — SIGNIFICANT CHANGE UP (ref 0–0.9)
MONOCYTES NFR BLD AUTO: 5.9 % — SIGNIFICANT CHANGE UP (ref 2–14)
NEUTROPHILS # BLD AUTO: 5.98 K/UL — SIGNIFICANT CHANGE UP (ref 1.8–7.4)
NEUTROPHILS NFR BLD AUTO: 82.1 % — HIGH (ref 43–77)
PHOSPHATE SERPL-MCNC: 3.2 MG/DL — SIGNIFICANT CHANGE UP (ref 2.4–4.7)
PLATELET # BLD AUTO: 246 K/UL — SIGNIFICANT CHANGE UP (ref 150–400)
POTASSIUM SERPL-MCNC: 3.7 MMOL/L — SIGNIFICANT CHANGE UP (ref 3.5–5.3)
POTASSIUM SERPL-SCNC: 3.7 MMOL/L — SIGNIFICANT CHANGE UP (ref 3.5–5.3)
RBC # BLD: 3.97 M/UL — SIGNIFICANT CHANGE UP (ref 3.8–5.2)
RBC # FLD: 12.9 % — SIGNIFICANT CHANGE UP (ref 10.3–14.5)
SODIUM SERPL-SCNC: 137 MMOL/L — SIGNIFICANT CHANGE UP (ref 135–145)
WBC # BLD: 7.28 K/UL — SIGNIFICANT CHANGE UP (ref 3.8–10.5)
WBC # FLD AUTO: 7.28 K/UL — SIGNIFICANT CHANGE UP (ref 3.8–10.5)

## 2019-08-08 PROCEDURE — 74018 RADEX ABDOMEN 1 VIEW: CPT | Mod: 26

## 2019-08-08 RX ORDER — ENOXAPARIN SODIUM 100 MG/ML
40 INJECTION SUBCUTANEOUS DAILY
Refills: 0 | Status: DISCONTINUED | OUTPATIENT
Start: 2019-08-08 | End: 2019-08-14

## 2019-08-08 RX ORDER — ACETAMINOPHEN 500 MG
1000 TABLET ORAL ONCE
Refills: 0 | Status: COMPLETED | OUTPATIENT
Start: 2019-08-08 | End: 2019-08-08

## 2019-08-08 RX ORDER — ACETAMINOPHEN 500 MG
650 TABLET ORAL EVERY 6 HOURS
Refills: 0 | Status: DISCONTINUED | OUTPATIENT
Start: 2019-08-08 | End: 2019-08-10

## 2019-08-08 RX ADMIN — Medication 650 MILLIGRAM(S): at 08:40

## 2019-08-08 RX ADMIN — PANTOPRAZOLE SODIUM 40 MILLIGRAM(S): 20 TABLET, DELAYED RELEASE ORAL at 17:17

## 2019-08-08 RX ADMIN — Medication 400 MILLIGRAM(S): at 21:06

## 2019-08-08 RX ADMIN — Medication 1000 MILLIGRAM(S): at 22:00

## 2019-08-08 RX ADMIN — Medication 650 MILLIGRAM(S): at 14:49

## 2019-08-08 RX ADMIN — SODIUM CHLORIDE 100 MILLILITER(S): 9 INJECTION, SOLUTION INTRAVENOUS at 06:02

## 2019-08-08 RX ADMIN — PANTOPRAZOLE SODIUM 40 MILLIGRAM(S): 20 TABLET, DELAYED RELEASE ORAL at 06:01

## 2019-08-08 RX ADMIN — SODIUM CHLORIDE 100 MILLILITER(S): 9 INJECTION, SOLUTION INTRAVENOUS at 23:12

## 2019-08-08 RX ADMIN — Medication 400 MILLIGRAM(S): at 02:57

## 2019-08-08 RX ADMIN — Medication 10 MILLIGRAM(S): at 02:10

## 2019-08-08 RX ADMIN — ENOXAPARIN SODIUM 40 MILLIGRAM(S): 100 INJECTION SUBCUTANEOUS at 12:21

## 2019-08-08 RX ADMIN — MORPHINE SULFATE 2 MILLIGRAM(S): 50 CAPSULE, EXTENDED RELEASE ORAL at 12:21

## 2019-08-08 NOTE — PROGRESS NOTE ADULT - SUBJECTIVE AND OBJECTIVE BOX
SUBJECTIVE    Patient seen and examined by me at 930 this morning.  Has been, Sarath, at bedside.  Patient complains of uncomfortable NG tube.  Less abdominal pain, states she had a small bowel movement this morning.    REVIEW OF SYSTEMS    General: Not in any pain	    Skin/Breast: No rash  	  ENMT: No visual problems, no sore throat	    Respiratory and Thorax: No cough, No CP, No SOB  	  Cardiovascular: No CP, No palpitations    Gastrointestinal: No Abd pain, No N/V/D    Musculoskeletal: No Joint pain, No back pain	    Neurological: No headache    Psychiatric: No anxiety      OBJECTIVE    Vital Signs Last 24 Hrs  T(C): 37.2 (08-08-19 @ 17:41), Max: 37.2 (08-08-19 @ 17:41)  T(F): 99 (08-08-19 @ 17:41), Max: 99 (08-08-19 @ 17:41)  HR: 91 (08-08-19 @ 17:41) (74 - 91)  BP: 127/76 (08-08-19 @ 17:41) (106/66 - 134/77)  BP(mean): --  RR: 19 (08-08-19 @ 17:41) (18 - 19)  SpO2: 96% (08-08-19 @ 17:41) (95% - 97%)    PHYSICAL EXAM:    Constitutional: Not in any distress    Eyes: No conjunctival injection    ENMT: No oral lesions    Neck: No nodes, no adenopathy    Back: Straight, no defects    Respiratory: clear b/l    Cardiovascular: RRR, no murmur    Gastrointestinal: Bowel sounds not present    Extremities: No edema, no erythema    Neurological: no focal deficit    Skin: No rash      MEDICATIONS  (STANDING):  enoxaparin Injectable 40 milliGRAM(s) SubCutaneous daily  lactated ringers. 1000 milliLiter(s) (100 mL/Hr) IV Continuous <Continuous>  pantoprazole  Injectable 40 milliGRAM(s) IV Push two times a day    MEDICATIONS  (PRN):  acetaminophen    Suspension .. 650 milliGRAM(s) Oral every 6 hours PRN Temp greater or equal to 38C (100.4F), Mild Pain (1 - 3)  benzocaine 20% Spray 1 Spray(s) Topical every 6 hours PRN throat irritation  metoclopramide Injectable 10 milliGRAM(s) IV Push every 6 hours PRN n/v  morphine  - Injectable 2 milliGRAM(s) IV Push every 6 hours PRN Mod-severe pain 4-10  morphine  - Injectable 4 milliGRAM(s) IV Push every 6 hours PRN breakthrough pain  ondansetron Injectable 4 milliGRAM(s) IV Push every 4 hours PRN Nausea and/or Vomiting                              11.7   7.28  )-----------( 246      ( 08 Aug 2019 06:25 )             36.3     08 Aug 2019 06:25    137    |  100    |  10.0   ----------------------------<  110    3.7     |  25.0   |  0.47     Ca    9.1        08 Aug 2019 06:25  Phos  3.2       08 Aug 2019 06:25  Mg     2.0       08 Aug 2019 06:25    TPro  7.8    /  Alb  4.5    /  TBili  0.2    /  DBili  x      /  AST  21     /  ALT  18     /  AlkPhos  73     06 Aug 2019 22:53    Allergies    CT Dye-Lips and Tongue Swelling (Other)  Feraheme (Other)    Intolerances

## 2019-08-08 NOTE — PROGRESS NOTE ADULT - SUBJECTIVE AND OBJECTIVE BOX
No N or V today ,no BM or flatus ,abdomen softer no rebound or guarding ,.patient feels better .If AXR Improved trial of clamp NGT x 4 hours and if telerated remove tube and if not tolerated resume suction.

## 2019-08-08 NOTE — PROGRESS NOTE ADULT - ASSESSMENT
Patient is a 63-year-old female with long history of small bowel obstructions and well known to me.  Patient had recent abdominal wall hernia repair January 2019.  Colonoscopy done earlier this summer showed a sessile polyp adenoma on the right side for: Which is to be further evaluated by a GI doctor (Ej).    Patient with a past medical history breast cancer, GERD for which he takes Dexilant, stress incontinence for which she takes Vesicare, and mild depression for which she takes Viibryd.

## 2019-08-09 LAB
ANION GAP SERPL CALC-SCNC: 12 MMOL/L — SIGNIFICANT CHANGE UP (ref 5–17)
APPEARANCE UR: CLEAR — SIGNIFICANT CHANGE UP
BILIRUB UR-MCNC: NEGATIVE — SIGNIFICANT CHANGE UP
BUN SERPL-MCNC: 11 MG/DL — SIGNIFICANT CHANGE UP (ref 8–20)
CALCIUM SERPL-MCNC: 9 MG/DL — SIGNIFICANT CHANGE UP (ref 8.6–10.2)
CHLORIDE SERPL-SCNC: 97 MMOL/L — LOW (ref 98–107)
CO2 SERPL-SCNC: 26 MMOL/L — SIGNIFICANT CHANGE UP (ref 22–29)
COLOR SPEC: YELLOW — SIGNIFICANT CHANGE UP
CREAT SERPL-MCNC: 0.44 MG/DL — LOW (ref 0.5–1.3)
DIFF PNL FLD: ABNORMAL
EPI CELLS # UR: SIGNIFICANT CHANGE UP
GLUCOSE SERPL-MCNC: 99 MG/DL — SIGNIFICANT CHANGE UP (ref 70–115)
GLUCOSE UR QL: NEGATIVE MG/DL — SIGNIFICANT CHANGE UP
HCT VFR BLD CALC: 37.3 % — SIGNIFICANT CHANGE UP (ref 34.5–45)
HGB BLD-MCNC: 12.1 G/DL — SIGNIFICANT CHANGE UP (ref 11.5–15.5)
KETONES UR-MCNC: ABNORMAL
LEUKOCYTE ESTERASE UR-ACNC: ABNORMAL
MAGNESIUM SERPL-MCNC: 2 MG/DL — SIGNIFICANT CHANGE UP (ref 1.8–2.6)
MCHC RBC-ENTMCNC: 29.3 PG — SIGNIFICANT CHANGE UP (ref 27–34)
MCHC RBC-ENTMCNC: 32.4 GM/DL — SIGNIFICANT CHANGE UP (ref 32–36)
MCV RBC AUTO: 90.3 FL — SIGNIFICANT CHANGE UP (ref 80–100)
NITRITE UR-MCNC: NEGATIVE — SIGNIFICANT CHANGE UP
PH UR: 6 — SIGNIFICANT CHANGE UP (ref 5–8)
PHOSPHATE SERPL-MCNC: 3 MG/DL — SIGNIFICANT CHANGE UP (ref 2.4–4.7)
PLATELET # BLD AUTO: 253 K/UL — SIGNIFICANT CHANGE UP (ref 150–400)
POTASSIUM SERPL-MCNC: 3.6 MMOL/L — SIGNIFICANT CHANGE UP (ref 3.5–5.3)
POTASSIUM SERPL-SCNC: 3.6 MMOL/L — SIGNIFICANT CHANGE UP (ref 3.5–5.3)
PROT UR-MCNC: 30 MG/DL
RBC # BLD: 4.13 M/UL — SIGNIFICANT CHANGE UP (ref 3.8–5.2)
RBC # FLD: 12.8 % — SIGNIFICANT CHANGE UP (ref 10.3–14.5)
RBC CASTS # UR COMP ASSIST: ABNORMAL /HPF (ref 0–4)
SODIUM SERPL-SCNC: 135 MMOL/L — SIGNIFICANT CHANGE UP (ref 135–145)
SP GR SPEC: 1.02 — SIGNIFICANT CHANGE UP (ref 1.01–1.02)
UROBILINOGEN FLD QL: 1 MG/DL
WBC # BLD: 5.98 K/UL — SIGNIFICANT CHANGE UP (ref 3.8–10.5)
WBC # FLD AUTO: 5.98 K/UL — SIGNIFICANT CHANGE UP (ref 3.8–10.5)
WBC UR QL: SIGNIFICANT CHANGE UP

## 2019-08-09 RX ORDER — POTASSIUM CHLORIDE 20 MEQ
10 PACKET (EA) ORAL
Refills: 0 | Status: COMPLETED | OUTPATIENT
Start: 2019-08-09 | End: 2019-08-09

## 2019-08-09 RX ORDER — POTASSIUM CHLORIDE 20 MEQ
20 PACKET (EA) ORAL ONCE
Refills: 0 | Status: COMPLETED | OUTPATIENT
Start: 2019-08-09 | End: 2019-08-09

## 2019-08-09 RX ORDER — ACETAMINOPHEN 500 MG
1000 TABLET ORAL ONCE
Refills: 0 | Status: COMPLETED | OUTPATIENT
Start: 2019-08-09 | End: 2019-08-09

## 2019-08-09 RX ADMIN — Medication 650 MILLIGRAM(S): at 18:44

## 2019-08-09 RX ADMIN — Medication 650 MILLIGRAM(S): at 13:30

## 2019-08-09 RX ADMIN — SODIUM CHLORIDE 100 MILLILITER(S): 9 INJECTION, SOLUTION INTRAVENOUS at 06:32

## 2019-08-09 RX ADMIN — MORPHINE SULFATE 2 MILLIGRAM(S): 50 CAPSULE, EXTENDED RELEASE ORAL at 08:07

## 2019-08-09 RX ADMIN — PANTOPRAZOLE SODIUM 40 MILLIGRAM(S): 20 TABLET, DELAYED RELEASE ORAL at 05:26

## 2019-08-09 RX ADMIN — PANTOPRAZOLE SODIUM 40 MILLIGRAM(S): 20 TABLET, DELAYED RELEASE ORAL at 17:27

## 2019-08-09 RX ADMIN — MORPHINE SULFATE 2 MILLIGRAM(S): 50 CAPSULE, EXTENDED RELEASE ORAL at 15:48

## 2019-08-09 RX ADMIN — Medication 650 MILLIGRAM(S): at 06:32

## 2019-08-09 RX ADMIN — ONDANSETRON 4 MILLIGRAM(S): 8 TABLET, FILM COATED ORAL at 21:00

## 2019-08-09 RX ADMIN — Medication 650 MILLIGRAM(S): at 05:29

## 2019-08-09 RX ADMIN — Medication 650 MILLIGRAM(S): at 19:45

## 2019-08-09 RX ADMIN — MORPHINE SULFATE 2 MILLIGRAM(S): 50 CAPSULE, EXTENDED RELEASE ORAL at 16:05

## 2019-08-09 RX ADMIN — MORPHINE SULFATE 2 MILLIGRAM(S): 50 CAPSULE, EXTENDED RELEASE ORAL at 21:50

## 2019-08-09 RX ADMIN — ENOXAPARIN SODIUM 40 MILLIGRAM(S): 100 INJECTION SUBCUTANEOUS at 12:44

## 2019-08-09 RX ADMIN — Medication 20 MILLIEQUIVALENT(S): at 15:48

## 2019-08-09 RX ADMIN — Medication 100 MILLIEQUIVALENT(S): at 09:42

## 2019-08-09 RX ADMIN — MORPHINE SULFATE 2 MILLIGRAM(S): 50 CAPSULE, EXTENDED RELEASE ORAL at 08:25

## 2019-08-09 RX ADMIN — Medication 650 MILLIGRAM(S): at 12:45

## 2019-08-09 NOTE — PROGRESS NOTE ADULT - SUBJECTIVE AND OBJECTIVE BOX
HPI/OVERNIGHT EVENTS: Yesterday, patient failed initial clamp trial. Per Dr. Moreno, 6 hour clamp trial started again around 6pm. Upon completion, residual was <50mL and NGT was removed. Patient did not complain of any nausea or vomiting this morning. She also denies fever, chills, diarrhea.    Vital Signs Last 24 Hrs  T(C): 37.4 (08 Aug 2019 23:42), Max: 37.4 (08 Aug 2019 23:42)  T(F): 99.3 (08 Aug 2019 23:42), Max: 99.3 (08 Aug 2019 23:42)  HR: 98 (08 Aug 2019 23:42) (86 - 98)  BP: 143/76 (08 Aug 2019 23:42) (106/66 - 143/76)  BP(mean): --  RR: 18 (08 Aug 2019 23:42) (18 - 19)  SpO2: 96% (08 Aug 2019 23:42) (95% - 96%)    I&O's Detail    08 Aug 2019 07:01  -  09 Aug 2019 00:20  --------------------------------------------------------  IN:    lactated ringers.: 1200 mL  Total IN: 1200 mL    OUT:    Nasoenteral Tube: 200 mL  Total OUT: 200 mL    Total NET: 1000 mL      Constitutional: patient resting comfortably in bed, in no acute distress  Respiratory: CTAB respirations are unlabored, no accessory muscle use, no conversational dyspnea  Gastrointestinal: Abdomen soft, non-tender, non-distended, no rebound tenderness / guarding      LABS:                        11.7   7.28  )-----------( 246      ( 08 Aug 2019 06:25 )             36.3     08-08    137  |  100  |  10.0  ----------------------------<  110  3.7   |  25.0  |  0.47<L>    Ca    9.1      08 Aug 2019 06:25  Phos  3.2     08-08  Mg     2.0     08-08        MEDICATIONS  (STANDING):  enoxaparin Injectable 40 milliGRAM(s) SubCutaneous daily  lactated ringers. 1000 milliLiter(s) (100 mL/Hr) IV Continuous <Continuous>  pantoprazole  Injectable 40 milliGRAM(s) IV Push two times a day    MEDICATIONS  (PRN):  acetaminophen    Suspension .. 650 milliGRAM(s) Oral every 6 hours PRN Temp greater or equal to 38C (100.4F), Mild Pain (1 - 3)  benzocaine 20% Spray 1 Spray(s) Topical every 6 hours PRN throat irritation  metoclopramide Injectable 10 milliGRAM(s) IV Push every 6 hours PRN n/v  morphine  - Injectable 2 milliGRAM(s) IV Push every 6 hours PRN Mod-severe pain 4-10  morphine  - Injectable 4 milliGRAM(s) IV Push every 6 hours PRN breakthrough pain  ondansetron Injectable 4 milliGRAM(s) IV Push every 4 hours PRN Nausea and/or Vomiting

## 2019-08-09 NOTE — PROGRESS NOTE ADULT - SUBJECTIVE AND OBJECTIVE BOX
AXR Contrast to rectum,Had BM ,Mild tenderness .feeding trial clear liquids today ,Full liquids tomorrow based on progress.

## 2019-08-09 NOTE — CHART NOTE - NSCHARTNOTEFT_GEN_A_CORE
Called to patient bedside for abdominal pain, 7/10. Patient seen and examined at bedside. Appears to be in discomfort. On exam her abdomen was soft,  distended, and slightly tender to palpation in RLQ. Reports belching and having liquid bowel movements. Denies any nausea, vomiting, or pain elsewhere. She was apprehensive to take narcotic pain medications with concerns of constipation, but advised the addressing pain was of higher priority. She was agreeable to pain medication thereafter. Plan to follow pain and distention in following hours. If worse will consider KUB.

## 2019-08-09 NOTE — PROGRESS NOTE ADULT - SUBJECTIVE AND OBJECTIVE BOX
SUBJECTIVE    Patient seen and examined by me at bedside at 1030 this morning.  Daughter at bedside, patient had episode of abdominal pain and abdominal distention this morning.  Patient states she is passing gas and pain and distention has resolved.    REVIEW OF SYSTEMS    General: Not in any pain	    Skin/Breast: No rash  	  ENMT: No visual problems, no sore throat	    Respiratory and Thorax: No cough, No CP, No SOB  	  Cardiovascular: No CP, No palpitations    Gastrointestinal: No Abd pain, No N/V/D    Musculoskeletal: No Joint pain, No back pain	    Neurological: No headache    Psychiatric: No anxiety      OBJECTIVE    Vital Signs Last 24 Hrs  T(C): 36.9 (08-09-19 @ 15:55), Max: 37.4 (08-08-19 @ 23:42)  T(F): 98.4 (08-09-19 @ 15:55), Max: 99.3 (08-08-19 @ 23:42)  HR: 94 (08-09-19 @ 15:55) (84 - 98)  BP: 131/77 (08-09-19 @ 15:55) (131/77 - 143/76)  BP(mean): --  RR: 18 (08-09-19 @ 15:55) (18 - 18)  SpO2: 95% (08-09-19 @ 15:55) (95% - 96%)    PHYSICAL EXAM:    Constitutional: Not in any distress    Eyes: No conjunctival injection    ENMT: No oral lesions    Neck: No nodes, no adenopathy    Back: Straight, no defects    Respiratory: clear b/l    Cardiovascular: RRR, no murmur    Gastrointestinal: soft, NT, ND, Bowel sounds present    Extremities: No edema, no erythema    Neurological: no focal deficit    Skin: No rash      MEDICATIONS  (STANDING):  enoxaparin Injectable 40 milliGRAM(s) SubCutaneous daily  lactated ringers. 1000 milliLiter(s) (100 mL/Hr) IV Continuous <Continuous>  pantoprazole  Injectable 40 milliGRAM(s) IV Push two times a day    MEDICATIONS  (PRN):  acetaminophen    Suspension .. 650 milliGRAM(s) Oral every 6 hours PRN Temp greater or equal to 38C (100.4F), Mild Pain (1 - 3)  benzocaine 20% Spray 1 Spray(s) Topical every 6 hours PRN throat irritation  metoclopramide Injectable 10 milliGRAM(s) IV Push every 6 hours PRN n/v  morphine  - Injectable 2 milliGRAM(s) IV Push every 6 hours PRN Mod-severe pain 4-10  morphine  - Injectable 4 milliGRAM(s) IV Push every 6 hours PRN breakthrough pain  ondansetron Injectable 4 milliGRAM(s) IV Push every 4 hours PRN Nausea and/or Vomiting                              12.1   5.98  )-----------( 253      ( 09 Aug 2019 06:32 )             37.3     09 Aug 2019 06:32    135    |  97     |  11.0   ----------------------------<  99     3.6     |  26.0   |  0.44     Ca    9.0        09 Aug 2019 06:32  Phos  3.0       09 Aug 2019 06:32  Mg     2.0       09 Aug 2019 06:32      Allergies    CT Dye-Lips and Tongue Swelling (Other)  Feraheme (Other)    Intolerances

## 2019-08-09 NOTE — PROGRESS NOTE ADULT - ASSESSMENT
63 year old female with extensive abdominal surgery presenting with signs and symptoms of small bowel obstruction. NGT removed after resolution of abdominal pain and nausea.    -ADAT  -monitor for bowel function  -serial abdominal exams  -rest of care as per primary team   -encourage ambulation

## 2019-08-09 NOTE — CHART NOTE - NSCHARTNOTEFT_GEN_A_CORE
- called by nurse to inform me patient is complaining of abdominal pain after pain medication given.  - pt seen and examined at bedside. Pt states the abdominal pain is getting worst.  - Patient rates the pain 10/10, localized the left lower quadrant.        Gen: Adult female sitting upright in a chair  GI: Distended, tenderness to palpation in LLQ. +BS in LUQ and LLQ.       a/p  - abdominal pain concerned for worsening SBO  - BMP, KUB  - general surgery called- will come to evaluate the patient

## 2019-08-10 LAB
ANION GAP SERPL CALC-SCNC: 20 MMOL/L — HIGH (ref 5–17)
BASOPHILS # BLD AUTO: 0.01 K/UL — SIGNIFICANT CHANGE UP (ref 0–0.2)
BASOPHILS NFR BLD AUTO: 0.2 % — SIGNIFICANT CHANGE UP (ref 0–2)
BUN SERPL-MCNC: 13 MG/DL — SIGNIFICANT CHANGE UP (ref 8–20)
CALCIUM SERPL-MCNC: 8.8 MG/DL — SIGNIFICANT CHANGE UP (ref 8.6–10.2)
CHLORIDE SERPL-SCNC: 101 MMOL/L — SIGNIFICANT CHANGE UP (ref 98–107)
CO2 SERPL-SCNC: 20 MMOL/L — LOW (ref 22–29)
CREAT SERPL-MCNC: 0.4 MG/DL — LOW (ref 0.5–1.3)
EOSINOPHIL # BLD AUTO: 0.03 K/UL — SIGNIFICANT CHANGE UP (ref 0–0.5)
EOSINOPHIL NFR BLD AUTO: 0.7 % — SIGNIFICANT CHANGE UP (ref 0–6)
GLUCOSE SERPL-MCNC: 108 MG/DL — SIGNIFICANT CHANGE UP (ref 70–115)
HCT VFR BLD CALC: 38 % — SIGNIFICANT CHANGE UP (ref 34.5–45)
HGB BLD-MCNC: 12.3 G/DL — SIGNIFICANT CHANGE UP (ref 11.5–15.5)
IMM GRANULOCYTES NFR BLD AUTO: 0.2 % — SIGNIFICANT CHANGE UP (ref 0–1.5)
LYMPHOCYTES # BLD AUTO: 0.52 K/UL — LOW (ref 1–3.3)
LYMPHOCYTES # BLD AUTO: 11.6 % — LOW (ref 13–44)
MAGNESIUM SERPL-MCNC: 2 MG/DL — SIGNIFICANT CHANGE UP (ref 1.6–2.6)
MCHC RBC-ENTMCNC: 28.7 PG — SIGNIFICANT CHANGE UP (ref 27–34)
MCHC RBC-ENTMCNC: 32.4 GM/DL — SIGNIFICANT CHANGE UP (ref 32–36)
MCV RBC AUTO: 88.8 FL — SIGNIFICANT CHANGE UP (ref 80–100)
MONOCYTES # BLD AUTO: 0.56 K/UL — SIGNIFICANT CHANGE UP (ref 0–0.9)
MONOCYTES NFR BLD AUTO: 12.5 % — SIGNIFICANT CHANGE UP (ref 2–14)
NEUTROPHILS # BLD AUTO: 3.34 K/UL — SIGNIFICANT CHANGE UP (ref 1.8–7.4)
NEUTROPHILS NFR BLD AUTO: 74.8 % — SIGNIFICANT CHANGE UP (ref 43–77)
PHOSPHATE SERPL-MCNC: 3.2 MG/DL — SIGNIFICANT CHANGE UP (ref 2.4–4.7)
PLATELET # BLD AUTO: 243 K/UL — SIGNIFICANT CHANGE UP (ref 150–400)
POTASSIUM SERPL-MCNC: 3.9 MMOL/L — SIGNIFICANT CHANGE UP (ref 3.5–5.3)
POTASSIUM SERPL-SCNC: 3.9 MMOL/L — SIGNIFICANT CHANGE UP (ref 3.5–5.3)
RBC # BLD: 4.28 M/UL — SIGNIFICANT CHANGE UP (ref 3.8–5.2)
RBC # FLD: 12.6 % — SIGNIFICANT CHANGE UP (ref 10.3–14.5)
SODIUM SERPL-SCNC: 141 MMOL/L — SIGNIFICANT CHANGE UP (ref 135–145)
WBC # BLD: 4.47 K/UL — SIGNIFICANT CHANGE UP (ref 3.8–10.5)
WBC # FLD AUTO: 4.47 K/UL — SIGNIFICANT CHANGE UP (ref 3.8–10.5)

## 2019-08-10 PROCEDURE — 74018 RADEX ABDOMEN 1 VIEW: CPT | Mod: 26

## 2019-08-10 RX ORDER — ACETAMINOPHEN 500 MG
650 TABLET ORAL EVERY 6 HOURS
Refills: 0 | Status: DISCONTINUED | OUTPATIENT
Start: 2019-08-10 | End: 2019-08-14

## 2019-08-10 RX ADMIN — PANTOPRAZOLE SODIUM 40 MILLIGRAM(S): 20 TABLET, DELAYED RELEASE ORAL at 05:45

## 2019-08-10 RX ADMIN — MORPHINE SULFATE 4 MILLIGRAM(S): 50 CAPSULE, EXTENDED RELEASE ORAL at 00:36

## 2019-08-10 RX ADMIN — ONDANSETRON 4 MILLIGRAM(S): 8 TABLET, FILM COATED ORAL at 20:55

## 2019-08-10 RX ADMIN — Medication 10 MILLIGRAM(S): at 00:36

## 2019-08-10 RX ADMIN — Medication 650 MILLIGRAM(S): at 12:15

## 2019-08-10 RX ADMIN — ENOXAPARIN SODIUM 40 MILLIGRAM(S): 100 INJECTION SUBCUTANEOUS at 12:15

## 2019-08-10 RX ADMIN — MORPHINE SULFATE 2 MILLIGRAM(S): 50 CAPSULE, EXTENDED RELEASE ORAL at 17:31

## 2019-08-10 RX ADMIN — Medication 650 MILLIGRAM(S): at 13:15

## 2019-08-10 RX ADMIN — MORPHINE SULFATE 2 MILLIGRAM(S): 50 CAPSULE, EXTENDED RELEASE ORAL at 05:44

## 2019-08-10 RX ADMIN — PANTOPRAZOLE SODIUM 40 MILLIGRAM(S): 20 TABLET, DELAYED RELEASE ORAL at 17:31

## 2019-08-10 RX ADMIN — MORPHINE SULFATE 4 MILLIGRAM(S): 50 CAPSULE, EXTENDED RELEASE ORAL at 20:51

## 2019-08-10 RX ADMIN — SODIUM CHLORIDE 100 MILLILITER(S): 9 INJECTION, SOLUTION INTRAVENOUS at 05:44

## 2019-08-10 RX ADMIN — MORPHINE SULFATE 2 MILLIGRAM(S): 50 CAPSULE, EXTENDED RELEASE ORAL at 17:46

## 2019-08-10 NOTE — PROGRESS NOTE ADULT - SUBJECTIVE AND OBJECTIVE BOX
Had severe pain last night .AXR Dilated mid abdomen small bowel  Had severe pain last night .AXR Dilated mid abdomen small loop,no  guarding.no rebound. Extensive prior multiple surgeries with large mesh. Clear liquids  today and based on progress possible Gastrograffin  small   series monday if not advancing

## 2019-08-10 NOTE — CHART NOTE - NSCHARTNOTEFT_GEN_A_CORE
Called to bedside by RN regarding 1x episode of vomiting. Vomit examined at bedside; <50cc of non bloody, greenish brown emesis. patient reported resolution of nausea and feeling much better after vomiting. patient reports last bm was liquidy around 3 pm yesterday. Patient reports passing flatus. Patient remained afebrile with stable vital signs. Abdominal exam was soft non distended. bowel sounds were present. Primary team ordered KUB to evaluate- some distension in large bowel- final read pending.     - fu KUB  - will monitor for the potential need for NG tube

## 2019-08-10 NOTE — PROGRESS NOTE ADULT - SUBJECTIVE AND OBJECTIVE BOX
HPI/OVERNIGHT EVENTS: Patient seen and examined at bedside. 1 episode of emesis last night. <50 cc of non bloody emesis. patient reported severe abdominal pain prior that resolved with emesis. no other events. Vitals have been stable.  Denies fever, chills, nausea, vomiting, chest pain, SOB, dizziness, abd pain or any other concerning symptoms    Vital Signs Last 24 Hrs  T(C): 37.5 (09 Aug 2019 23:56), Max: 37.5 (09 Aug 2019 23:56)  T(F): 99.5 (09 Aug 2019 23:56), Max: 99.5 (09 Aug 2019 23:56)  HR: 86 (09 Aug 2019 23:56) (84 - 94)  BP: 147/83 (09 Aug 2019 23:56) (131/77 - 147/83)  BP(mean): --  RR: 18 (09 Aug 2019 23:56) (18 - 18)  SpO2: 95% (09 Aug 2019 23:56) (95% - 95%)    I&O's Detail    08 Aug 2019 07:01  -  09 Aug 2019 07:00  --------------------------------------------------------  IN:    lactated ringers.: 2300 mL  Total IN: 2300 mL    OUT:    Nasoenteral Tube: 200 mL  Total OUT: 200 mL    Total NET: 2100 mL        Constitutional: patient resting comfortably in bed, in no acute distress  CV: RRR, no gallops murmurs or rubs  Respiratory: CTAB respirations are unlabored, no accessory muscle use, no conversational dyspnea  Gastrointestinal: Abdomen soft, non-tender, non-distended, no rebound tenderness / guarding    LABS:                        12.1   5.98  )-----------( 253      ( 09 Aug 2019 06:32 )             37.3         135  |  97<L>  |  11.0  ----------------------------<  99  3.6   |  26.0  |  0.44<L>    Ca    9.0      09 Aug 2019 06:32  Phos  3.0     08-09  Mg     2.0     08-09        Urinalysis Basic - ( 09 Aug 2019 20:49 )    Color: Yellow / Appearance: Clear / S.020 / pH: x  Gluc: x / Ketone: Large  / Bili: Negative / Urobili: 1 mg/dL   Blood: x / Protein: 30 mg/dL / Nitrite: Negative   Leuk Esterase: Small / RBC: 3-5 /HPF / WBC 0-2   Sq Epi: x / Non Sq Epi: Few / Bacteria: x        MEDICATIONS  (STANDING):  enoxaparin Injectable 40 milliGRAM(s) SubCutaneous daily  lactated ringers. 1000 milliLiter(s) (100 mL/Hr) IV Continuous <Continuous>  pantoprazole  Injectable 40 milliGRAM(s) IV Push two times a day    MEDICATIONS  (PRN):  acetaminophen    Suspension .. 650 milliGRAM(s) Oral every 6 hours PRN Temp greater or equal to 38C (100.4F), Mild Pain (1 - 3)  benzocaine 20% Spray 1 Spray(s) Topical every 6 hours PRN throat irritation  metoclopramide Injectable 10 milliGRAM(s) IV Push every 6 hours PRN n/v  morphine  - Injectable 2 milliGRAM(s) IV Push every 6 hours PRN Mod-severe pain 4-10  morphine  - Injectable 4 milliGRAM(s) IV Push every 6 hours PRN breakthrough pain  ondansetron Injectable 4 milliGRAM(s) IV Push every 4 hours PRN Nausea and/or Vomiting HPI/OVERNIGHT EVENTS: Patient seen and examined at bedside. 1 episode of emesis last night. <50 cc of non bloody emesis. patient reported severe abdominal pain prior that resolved with emesis. no other events. Vitals have been stable. Denies fever, chills, nausea, vomiting, chest pain, SOB, dizziness, abd pain or any other concerning symptoms.    Vital Signs Last 24 Hrs  T(C): 37.5 (09 Aug 2019 23:56), Max: 37.5 (09 Aug 2019 23:56)  T(F): 99.5 (09 Aug 2019 23:56), Max: 99.5 (09 Aug 2019 23:56)  HR: 86 (09 Aug 2019 23:56) (84 - 94)  BP: 147/83 (09 Aug 2019 23:56) (131/77 - 147/83)  BP(mean): --  RR: 18 (09 Aug 2019 23:56) (18 - 18)  SpO2: 95% (09 Aug 2019 23:56) (95% - 95%)    I&O's Detail    08 Aug 2019 07:01  -  09 Aug 2019 07:00  --------------------------------------------------------  IN:    lactated ringers.: 2300 mL  Total IN: 2300 mL    OUT:    Nasoenteral Tube: 200 mL  Total OUT: 200 mL    Total NET: 2100 mL        Constitutional: patient resting comfortably in bed, in no acute distress  CV: RRR, no gallops murmurs or rubs  Respiratory: CTAB respirations are unlabored, no accessory muscle use, no conversational dyspnea  Gastrointestinal: Abdomen soft, non-tender, non-distended, no rebound tenderness / guarding    LABS:                        12.1   5.98  )-----------( 253      ( 09 Aug 2019 06:32 )             37.3     08-    135  |  97<L>  |  11.0  ----------------------------<  99  3.6   |  26.0  |  0.44<L>    Ca    9.0      09 Aug 2019 06:32  Phos  3.0     08-09  Mg     2.0     08-09        Urinalysis Basic - ( 09 Aug 2019 20:49 )    Color: Yellow / Appearance: Clear / S.020 / pH: x  Gluc: x / Ketone: Large  / Bili: Negative / Urobili: 1 mg/dL   Blood: x / Protein: 30 mg/dL / Nitrite: Negative   Leuk Esterase: Small / RBC: 3-5 /HPF / WBC 0-2   Sq Epi: x / Non Sq Epi: Few / Bacteria: x        MEDICATIONS  (STANDING):  enoxaparin Injectable 40 milliGRAM(s) SubCutaneous daily  lactated ringers. 1000 milliLiter(s) (100 mL/Hr) IV Continuous <Continuous>  pantoprazole  Injectable 40 milliGRAM(s) IV Push two times a day    MEDICATIONS  (PRN):  acetaminophen    Suspension .. 650 milliGRAM(s) Oral every 6 hours PRN Temp greater or equal to 38C (100.4F), Mild Pain (1 - 3)  benzocaine 20% Spray 1 Spray(s) Topical every 6 hours PRN throat irritation  metoclopramide Injectable 10 milliGRAM(s) IV Push every 6 hours PRN n/v  morphine  - Injectable 2 milliGRAM(s) IV Push every 6 hours PRN Mod-severe pain 4-10  morphine  - Injectable 4 milliGRAM(s) IV Push every 6 hours PRN breakthrough pain  ondansetron Injectable 4 milliGRAM(s) IV Push every 4 hours PRN Nausea and/or Vomiting

## 2019-08-10 NOTE — PROGRESS NOTE ADULT - SUBJECTIVE AND OBJECTIVE BOX
SUBJECTIVE    Patient doing better this morning.  Had episode of severe abdominal pain and vomiting last night.    REVIEW OF SYSTEMS    General: Not in any pain	    Skin/Breast: No rash  	  ENMT: No visual problems, no sore throat	    Respiratory and Thorax: No cough, No CP, No SOB  	  Cardiovascular: No CP, No palpitations    Gastrointestinal: No Abd pain, No N/V/D    Musculoskeletal: No Joint pain, No back pain	    Neurological: No headache    Psychiatric: No anxiety      OBJECTIVE    Vital Signs Last 24 Hrs  T(C): 36.8 (08-10-19 @ 09:37), Max: 37.5 (08-09-19 @ 23:56)  T(F): 98.2 (08-10-19 @ 09:37), Max: 99.5 (08-09-19 @ 23:56)  HR: 80 (08-10-19 @ 09:37) (80 - 94)  BP: 132/77 (08-10-19 @ 09:37) (131/77 - 147/83)  BP(mean): --  RR: 18 (08-10-19 @ 09:37) (18 - 18)  SpO2: 96% (08-10-19 @ 09:37) (95% - 96%)    PHYSICAL EXAM:    Constitutional: Not in any distress    Eyes: No conjunctival injection    ENMT: No oral lesions    Neck: No nodes, no adenopathy    Back: Straight, no defects    Respiratory: clear b/l    Cardiovascular: RRR, no murmur    Gastrointestinal: Po active bowel sounds.  Nontender    Extremities: No edema, no erythema    Neurological: no focal deficit    Skin: No rash      MEDICATIONS  (STANDING):  acetaminophen   Tablet .. 650 milliGRAM(s) Oral every 6 hours  enoxaparin Injectable 40 milliGRAM(s) SubCutaneous daily  lactated ringers. 1000 milliLiter(s) (100 mL/Hr) IV Continuous <Continuous>  pantoprazole  Injectable 40 milliGRAM(s) IV Push two times a day    MEDICATIONS  (PRN):  benzocaine 20% Spray 1 Spray(s) Topical every 6 hours PRN throat irritation  metoclopramide Injectable 10 milliGRAM(s) IV Push every 6 hours PRN n/v  morphine  - Injectable 2 milliGRAM(s) IV Push every 6 hours PRN Mod-severe pain 4-10  morphine  - Injectable 4 milliGRAM(s) IV Push every 6 hours PRN breakthrough pain  ondansetron Injectable 4 milliGRAM(s) IV Push every 4 hours PRN Nausea and/or Vomiting                              12.3   4.47  )-----------( 243      ( 10 Aug 2019 05:34 )             38.0     10 Aug 2019 05:34    141    |  101    |  13.0   ----------------------------<  108    3.9     |  20.0   |  0.40     Ca    8.8        10 Aug 2019 05:34  Phos  3.2       10 Aug 2019 05:34  Mg     2.0       10 Aug 2019 05:34      Allergies    CT Dye-Lips and Tongue Swelling (Other)  Feraheme (Other)    Intolerances

## 2019-08-10 NOTE — PROGRESS NOTE ADULT - ASSESSMENT
63 year old female with extensive abdominal surgery presenting with signs and symptoms of small bowel obstruction. NGT removed after resolution of abdominal pain and nausea.    - hold diet pending improvement in abdominal pain  - monitor for bowel function  - serial abdominal exams  - rest of care as per primary team   - encourage ambulation 63 year old female with extensive abdominal surgery presenting with signs and symptoms of small bowel obstruction. NGT removed after resolution of abdominal pain and nausea. Some pain and small emesis overnight, but has since resolved. KUB still shows small loop dilation.     - Clear liquid diet  - monitor for bowel function  - serial abdominal exams  - rest of care as per primary team   - encourage ambulation  - Possible Gastrograffin if not advancing by Monday

## 2019-08-11 LAB
ANION GAP SERPL CALC-SCNC: 13 MMOL/L — SIGNIFICANT CHANGE UP (ref 5–17)
BUN SERPL-MCNC: 12 MG/DL — SIGNIFICANT CHANGE UP (ref 8–20)
CALCIUM SERPL-MCNC: 9 MG/DL — SIGNIFICANT CHANGE UP (ref 8.6–10.2)
CHLORIDE SERPL-SCNC: 98 MMOL/L — SIGNIFICANT CHANGE UP (ref 98–107)
CO2 SERPL-SCNC: 23 MMOL/L — SIGNIFICANT CHANGE UP (ref 22–29)
CREAT SERPL-MCNC: 0.47 MG/DL — LOW (ref 0.5–1.3)
GLUCOSE SERPL-MCNC: 64 MG/DL — LOW (ref 70–115)
POTASSIUM SERPL-MCNC: 3.8 MMOL/L — SIGNIFICANT CHANGE UP (ref 3.5–5.3)
POTASSIUM SERPL-SCNC: 3.8 MMOL/L — SIGNIFICANT CHANGE UP (ref 3.5–5.3)
SODIUM SERPL-SCNC: 134 MMOL/L — LOW (ref 135–145)

## 2019-08-11 RX ADMIN — Medication 650 MILLIGRAM(S): at 12:35

## 2019-08-11 RX ADMIN — Medication 650 MILLIGRAM(S): at 18:50

## 2019-08-11 RX ADMIN — PANTOPRAZOLE SODIUM 40 MILLIGRAM(S): 20 TABLET, DELAYED RELEASE ORAL at 18:50

## 2019-08-11 RX ADMIN — PANTOPRAZOLE SODIUM 40 MILLIGRAM(S): 20 TABLET, DELAYED RELEASE ORAL at 06:00

## 2019-08-11 RX ADMIN — Medication 650 MILLIGRAM(S): at 13:30

## 2019-08-11 RX ADMIN — ENOXAPARIN SODIUM 40 MILLIGRAM(S): 100 INJECTION SUBCUTANEOUS at 12:35

## 2019-08-11 RX ADMIN — Medication 10 MILLIGRAM(S): at 22:40

## 2019-08-11 RX ADMIN — Medication 650 MILLIGRAM(S): at 06:01

## 2019-08-11 NOTE — PROGRESS NOTE ADULT - SUBJECTIVE AND OBJECTIVE BOX
feels much better ,had BM ,Passed flatus  ,abdomen soft non tender no rebound or guarding ,advance to full liquid for lunch and may have coffee this am.

## 2019-08-11 NOTE — PROGRESS NOTE ADULT - SUBJECTIVE AND OBJECTIVE BOX
SUBJECTIVE    Patient doing much better today, had episode of abdominal pain last night.  Tolerating clear liquid diet, complains of frequent urination.    REVIEW OF SYSTEMS    General: Not in any pain	    Skin/Breast: No rash  	  ENMT: No visual problems, no sore throat	    Respiratory and Thorax: No cough, No CP, No SOB  	  Cardiovascular: No CP, No palpitations    Gastrointestinal: No Abd pain, No N/V/D    Musculoskeletal: No Joint pain, No back pain	    Neurological: No headache    Psychiatric: No anxiety      OBJECTIVE    Vital Signs Last 24 Hrs  T(C): 36.8 (08-11-19 @ 09:05), Max: 36.8 (08-11-19 @ 00:03)  T(F): 98.2 (08-11-19 @ 09:05), Max: 98.2 (08-11-19 @ 00:03)  HR: 75 (08-11-19 @ 09:05) (75 - 86)  BP: 128/76 (08-11-19 @ 09:05) (125/78 - 130/78)  BP(mean): --  RR: 18 (08-11-19 @ 09:05) (18 - 18)  SpO2: 97% (08-11-19 @ 09:05) (93% - 97%)    PHYSICAL EXAM:    Constitutional: Not in any distress    Eyes: No conjunctival injection    ENMT: No oral lesions    Neck: No nodes, no adenopathy    Back: Straight, no defects    Respiratory: clear b/l    Cardiovascular: RRR, no murmur    Gastrointestinal: soft, NT, ND    Extremities: No edema, no erythema    Neurological: no focal deficit    Skin: No rash      MEDICATIONS  (STANDING):  acetaminophen   Tablet .. 650 milliGRAM(s) Oral every 6 hours  enoxaparin Injectable 40 milliGRAM(s) SubCutaneous daily  pantoprazole  Injectable 40 milliGRAM(s) IV Push two times a day    MEDICATIONS  (PRN):  benzocaine 20% Spray 1 Spray(s) Topical every 6 hours PRN throat irritation  metoclopramide Injectable 10 milliGRAM(s) IV Push every 6 hours PRN n/v  morphine  - Injectable 2 milliGRAM(s) IV Push every 6 hours PRN Mod-severe pain 4-10  morphine  - Injectable 4 milliGRAM(s) IV Push every 6 hours PRN breakthrough pain  ondansetron Injectable 4 milliGRAM(s) IV Push every 4 hours PRN Nausea and/or Vomiting                              12.3   4.47  )-----------( 243      ( 10 Aug 2019 05:34 )             38.0     11 Aug 2019 06:43    134    |  98     |  12.0   ----------------------------<  64     3.8     |  23.0   |  0.47     Ca    9.0        11 Aug 2019 06:43  Phos  3.2       10 Aug 2019 05:34  Mg     2.0       10 Aug 2019 05:34      Allergies    CT Dye-Lips and Tongue Swelling (Other)  Feraheme (Other)    Intolerances

## 2019-08-11 NOTE — PROGRESS NOTE ADULT - ASSESSMENT
63 year old female with extensive abdominal surgery presenting with signs and symptoms of small bowel obstruction. NGT removed after resolution of abdominal pain and nausea. Tolerating clear liquids without nausea, vomiting, or pain.    - Clear liquid diet, ADAT  - serial abdominal exams  - rest of care as per primary team   - encourage OOB and ambulation  - Possible Gastrograffin worsening

## 2019-08-11 NOTE — PROGRESS NOTE ADULT - SUBJECTIVE AND OBJECTIVE BOX
HPI/OVERNIGHT EVENTS:  No acute overnight events. Tolerating a clear liquid diet. Denies any nausea, vomiting, fever, chills, chest pain, SOB, dizziness, abd pain or any other concerning symptoms. Has had gas and small formed stools. Ambulating well and independently.    MEDICATIONS  (STANDING):  acetaminophen   Tablet .. 650 milliGRAM(s) Oral every 6 hours  enoxaparin Injectable 40 milliGRAM(s) SubCutaneous daily  lactated ringers. 1000 milliLiter(s) (100 mL/Hr) IV Continuous <Continuous>  pantoprazole  Injectable 40 milliGRAM(s) IV Push two times a day    MEDICATIONS  (PRN):  benzocaine 20% Spray 1 Spray(s) Topical every 6 hours PRN throat irritation  metoclopramide Injectable 10 milliGRAM(s) IV Push every 6 hours PRN n/v  morphine  - Injectable 2 milliGRAM(s) IV Push every 6 hours PRN Mod-severe pain 4-10  morphine  - Injectable 4 milliGRAM(s) IV Push every 6 hours PRN breakthrough pain  ondansetron Injectable 4 milliGRAM(s) IV Push every 4 hours PRN Nausea and/or Vomiting      Vital Signs Last 24 Hrs  T(C): 36.8 (11 Aug 2019 00:03), Max: 36.8 (10 Aug 2019 09:37)  T(F): 98.2 (11 Aug 2019 00:03), Max: 98.2 (10 Aug 2019 09:37)  HR: 77 (11 Aug 2019 00:03) (77 - 86)  BP: 130/78 (11 Aug 2019 00:03) (125/78 - 132/77)  BP(mean): --  RR: 18 (11 Aug 2019 00:03) (18 - 18)  SpO2: 93% (11 Aug 2019 00:03) (93% - 97%)    Constitutional: patient resting comfortably in bed, in no acute distress  HEENT: EOMI, no active drainage or redness  Neck: No JVD, full ROM without pain  Respiratory: respirations are unlabored, no accessory muscle use, no conversational dyspnea  Cardiovascular: regular rate & rhythm  Gastrointestinal: Abdomen soft, non-tender, slightly distended, no rebound tenderness / guarding  Psychiatric: Normal mood, normal affect        I&O's Detail      LABS:                        12.3   4.47  )-----------( 243      ( 10 Aug 2019 05:34 )             38.0     08-10    141  |  101  |  13.0  ----------------------------<  108  3.9   |  20.0<L>  |  0.40<L>    Ca    8.8      10 Aug 2019 05:34  Phos  3.2     08-10  Mg     2.0     08-10        Urinalysis Basic - ( 09 Aug 2019 20:49 )    Color: Yellow / Appearance: Clear / S.020 / pH: x  Gluc: x / Ketone: Large  / Bili: Negative / Urobili: 1 mg/dL   Blood: x / Protein: 30 mg/dL / Nitrite: Negative   Leuk Esterase: Small / RBC: 3-5 /HPF / WBC 0-2   Sq Epi: x / Non Sq Epi: Few / Bacteria: x

## 2019-08-12 DIAGNOSIS — E87.6 HYPOKALEMIA: ICD-10-CM

## 2019-08-12 LAB
ANION GAP SERPL CALC-SCNC: 15 MMOL/L — SIGNIFICANT CHANGE UP (ref 5–17)
BUN SERPL-MCNC: 10 MG/DL — SIGNIFICANT CHANGE UP (ref 8–20)
CALCIUM SERPL-MCNC: 8.9 MG/DL — SIGNIFICANT CHANGE UP (ref 8.6–10.2)
CHLORIDE SERPL-SCNC: 100 MMOL/L — SIGNIFICANT CHANGE UP (ref 98–107)
CO2 SERPL-SCNC: 25 MMOL/L — SIGNIFICANT CHANGE UP (ref 22–29)
CREAT SERPL-MCNC: 0.51 MG/DL — SIGNIFICANT CHANGE UP (ref 0.5–1.3)
GLUCOSE BLDC GLUCOMTR-MCNC: 155 MG/DL — HIGH (ref 70–99)
GLUCOSE SERPL-MCNC: 89 MG/DL — SIGNIFICANT CHANGE UP (ref 70–115)
MAGNESIUM SERPL-MCNC: 2 MG/DL — SIGNIFICANT CHANGE UP (ref 1.6–2.6)
PHOSPHATE SERPL-MCNC: 3.9 MG/DL — SIGNIFICANT CHANGE UP (ref 2.4–4.7)
POTASSIUM SERPL-MCNC: 3.4 MMOL/L — LOW (ref 3.5–5.3)
POTASSIUM SERPL-SCNC: 3.4 MMOL/L — LOW (ref 3.5–5.3)
SODIUM SERPL-SCNC: 140 MMOL/L — SIGNIFICANT CHANGE UP (ref 135–145)

## 2019-08-12 PROCEDURE — 74018 RADEX ABDOMEN 1 VIEW: CPT | Mod: 26,77

## 2019-08-12 PROCEDURE — 74018 RADEX ABDOMEN 1 VIEW: CPT | Mod: 26

## 2019-08-12 RX ORDER — MORPHINE SULFATE 50 MG/1
2 CAPSULE, EXTENDED RELEASE ORAL EVERY 6 HOURS
Refills: 0 | Status: DISCONTINUED | OUTPATIENT
Start: 2019-08-15 | End: 2019-08-14

## 2019-08-12 RX ORDER — MORPHINE SULFATE 50 MG/1
4 CAPSULE, EXTENDED RELEASE ORAL EVERY 6 HOURS
Refills: 0 | Status: DISCONTINUED | OUTPATIENT
Start: 2019-08-15 | End: 2019-08-14

## 2019-08-12 RX ADMIN — Medication 650 MILLIGRAM(S): at 00:30

## 2019-08-12 RX ADMIN — PANTOPRAZOLE SODIUM 40 MILLIGRAM(S): 20 TABLET, DELAYED RELEASE ORAL at 18:59

## 2019-08-12 RX ADMIN — MORPHINE SULFATE 2 MILLIGRAM(S): 50 CAPSULE, EXTENDED RELEASE ORAL at 16:50

## 2019-08-12 RX ADMIN — Medication 650 MILLIGRAM(S): at 05:59

## 2019-08-12 RX ADMIN — Medication 650 MILLIGRAM(S): at 00:01

## 2019-08-12 RX ADMIN — PANTOPRAZOLE SODIUM 40 MILLIGRAM(S): 20 TABLET, DELAYED RELEASE ORAL at 05:59

## 2019-08-12 RX ADMIN — ENOXAPARIN SODIUM 40 MILLIGRAM(S): 100 INJECTION SUBCUTANEOUS at 13:31

## 2019-08-12 RX ADMIN — ONDANSETRON 4 MILLIGRAM(S): 8 TABLET, FILM COATED ORAL at 02:58

## 2019-08-12 RX ADMIN — Medication 650 MILLIGRAM(S): at 06:30

## 2019-08-12 RX ADMIN — MORPHINE SULFATE 2 MILLIGRAM(S): 50 CAPSULE, EXTENDED RELEASE ORAL at 03:20

## 2019-08-12 RX ADMIN — Medication 650 MILLIGRAM(S): at 23:49

## 2019-08-12 RX ADMIN — MORPHINE SULFATE 2 MILLIGRAM(S): 50 CAPSULE, EXTENDED RELEASE ORAL at 16:31

## 2019-08-12 RX ADMIN — MORPHINE SULFATE 2 MILLIGRAM(S): 50 CAPSULE, EXTENDED RELEASE ORAL at 02:58

## 2019-08-12 RX ADMIN — Medication 10 MILLIGRAM(S): at 16:31

## 2019-08-12 NOTE — PROGRESS NOTE ADULT - SUBJECTIVE AND OBJECTIVE BOX
Feels well at present , ,had vomiting  last night relieved with reglan .for gastrograffin study.abdomen soft non tender.

## 2019-08-12 NOTE — PROGRESS NOTE ADULT - ASSESSMENT
63 year old female with small bowel obstruction likely due to adhesive disease. slow progression but with bowel function    -pain control  -advance diet as tolerated  -possible gastrograffin challenge if not improving  -serial abdominal exams  -rest of care as per primary team

## 2019-08-12 NOTE — DIETITIAN INITIAL EVALUATION ADULT. - OTHER INFO
63 year old female with small bowel obstruction likely due to adhesive disease. slow progression but with bowel function. Pt has had 5 SBO's in the past.  Pt going for abdominal X-RAY today.     Pt reports she is tolerating clears, however once advanced to full liquids- had multiple episodes of emesis. Pt with hx of reflux and ate a whole bowl of tomato soup as well as other items on the tray. Advised Pt to avoid reflux foods, take small frequent sips. Take it slow and rest as needed. Low fiber-> gradually increasing to high fiber diet reviewed, written literature provided. Pt with good understanding.   No weight loss reported.

## 2019-08-12 NOTE — PROGRESS NOTE ADULT - SUBJECTIVE AND OBJECTIVE BOX
no acute events overnight. still some abdominal pain in lower abdomen. tolerated clears.       MEDICATIONS  (STANDING):  acetaminophen   Tablet .. 650 milliGRAM(s) Oral every 6 hours  enoxaparin Injectable 40 milliGRAM(s) SubCutaneous daily  pantoprazole  Injectable 40 milliGRAM(s) IV Push two times a day    MEDICATIONS  (PRN):  benzocaine 20% Spray 1 Spray(s) Topical every 6 hours PRN throat irritation  metoclopramide Injectable 10 milliGRAM(s) IV Push every 6 hours PRN n/v  morphine  - Injectable 4 milliGRAM(s) IV Push every 6 hours PRN breakthrough pain  morphine  - Injectable 2 milliGRAM(s) IV Push every 6 hours PRN Mod-severe pain 4-10  ondansetron Injectable 4 milliGRAM(s) IV Push every 4 hours PRN Nausea and/or Vomiting      Vital Signs Last 24 Hrs  T(C): 37.5 (11 Aug 2019 23:31), Max: 37.5 (11 Aug 2019 23:31)  T(F): 99.5 (11 Aug 2019 23:31), Max: 99.5 (11 Aug 2019 23:31)  HR: 80 (11 Aug 2019 23:31) (74 - 80)  BP: 133/81 (11 Aug 2019 23:31) (128/76 - 133/81)  BP(mean): --  RR: 18 (11 Aug 2019 23:31) (18 - 18)  SpO2: 96% (11 Aug 2019 23:31) (96% - 98%)    Physical Exam:    general: no acute distress, AOx3  Respiratory: Breath Sounds equal & clear to auscultation, no accessory muscle use  Cardiovascular: Regular rate & rhythm, normal S1, S2; no murmurs, gallops or rubs  Gastrointestinal: Soft, non-tender, nondistended  Extremities: No peripheral edema, No cyanosis, clubbing     Vascular: Equal and normal pulses: 2+ peripheral pulses throughout    Musculoskeletal: No joint pain, swelling or deformity; no limitation of movement    Skin: No rashes      I&O's Detail      LABS:    08-11    134<L>  |  98  |  12.0  ----------------------------<  64<L>  3.8   |  23.0  |  0.47<L>    Ca    9.0      11 Aug 2019 06:43            RADIOLOGY & ADDITIONAL STUDIES: no acute events overnight. still some abdominal pain in lower abdomen. not tolerating PO, had x2 emesis overnight.       MEDICATIONS  (STANDING):  acetaminophen   Tablet .. 650 milliGRAM(s) Oral every 6 hours  enoxaparin Injectable 40 milliGRAM(s) SubCutaneous daily  pantoprazole  Injectable 40 milliGRAM(s) IV Push two times a day    MEDICATIONS  (PRN):  benzocaine 20% Spray 1 Spray(s) Topical every 6 hours PRN throat irritation  metoclopramide Injectable 10 milliGRAM(s) IV Push every 6 hours PRN n/v  morphine  - Injectable 4 milliGRAM(s) IV Push every 6 hours PRN breakthrough pain  morphine  - Injectable 2 milliGRAM(s) IV Push every 6 hours PRN Mod-severe pain 4-10  ondansetron Injectable 4 milliGRAM(s) IV Push every 4 hours PRN Nausea and/or Vomiting      Vital Signs Last 24 Hrs  T(C): 37.5 (11 Aug 2019 23:31), Max: 37.5 (11 Aug 2019 23:31)  T(F): 99.5 (11 Aug 2019 23:31), Max: 99.5 (11 Aug 2019 23:31)  HR: 80 (11 Aug 2019 23:31) (74 - 80)  BP: 133/81 (11 Aug 2019 23:31) (128/76 - 133/81)  BP(mean): --  RR: 18 (11 Aug 2019 23:31) (18 - 18)  SpO2: 96% (11 Aug 2019 23:31) (96% - 98%)    Physical Exam:    general: no acute distress, AOx3  Respiratory: Breath Sounds equal & clear to auscultation, no accessory muscle use  Cardiovascular: Regular rate & rhythm, normal S1, S2; no murmurs, gallops or rubs  Gastrointestinal: Soft, non-tender, nondistended  Extremities: No peripheral edema, No cyanosis, clubbing     Vascular: Equal and normal pulses: 2+ peripheral pulses throughout    Musculoskeletal: No joint pain, swelling or deformity; no limitation of movement    Skin: No rashes      I&O's Detail      LABS:    08-11    134<L>  |  98  |  12.0  ----------------------------<  64<L>  3.8   |  23.0  |  0.47<L>    Ca    9.0      11 Aug 2019 06:43            RADIOLOGY & ADDITIONAL STUDIES:

## 2019-08-12 NOTE — PROGRESS NOTE ADULT - SUBJECTIVE AND OBJECTIVE BOX
SUBJECTIVE    Patient seen and examined by me at 9:00 this morning.  Patient stated she had episode of vomiting last night, but now feeling much better    REVIEW OF SYSTEMS    General: Not in any pain	    Skin/Breast: No rash  	  ENMT: No visual problems, no sore throat	    Respiratory and Thorax: No cough, No CP, No SOB  	  Cardiovascular: No CP, No palpitations    Gastrointestinal: No Abd pain, No N/V/D    Musculoskeletal: No Joint pain, No back pain	    Neurological: No headache    Psychiatric: No anxiety      OBJECTIVE    Vital Signs Last 24 Hrs  T(C): 36.7 (08-12-19 @ 08:13), Max: 37.5 (08-11-19 @ 23:31)  T(F): 98.1 (08-12-19 @ 08:13), Max: 99.5 (08-11-19 @ 23:31)  HR: 89 (08-12-19 @ 08:13) (80 - 89)  BP: 123/84 (08-12-19 @ 08:13) (123/84 - 133/81)  BP(mean): --  RR: 18 (08-12-19 @ 08:13) (18 - 18)  SpO2: 99% (08-12-19 @ 08:13) (96% - 99%)    PHYSICAL EXAM:    Constitutional: Not in any distress    Eyes: No conjunctival injection    ENMT: No oral lesions    Neck: No nodes, no adenopathy    Back: Straight, no defects    Respiratory: clear b/l    Cardiovascular: RRR, no murmur    Gastrointestinal: soft, NT, ND, Bowel sounds present    Extremities: No edema, no erythema    Neurological: no focal deficit    Skin: No rash      MEDICATIONS  (STANDING):  acetaminophen   Tablet .. 650 milliGRAM(s) Oral every 6 hours  enoxaparin Injectable 40 milliGRAM(s) SubCutaneous daily  pantoprazole  Injectable 40 milliGRAM(s) IV Push two times a day    MEDICATIONS  (PRN):  benzocaine 20% Spray 1 Spray(s) Topical every 6 hours PRN throat irritation  metoclopramide Injectable 10 milliGRAM(s) IV Push every 6 hours PRN n/v  morphine  - Injectable 4 milliGRAM(s) IV Push every 6 hours PRN breakthrough pain  morphine  - Injectable 2 milliGRAM(s) IV Push every 6 hours PRN Mod-severe pain 4-10  ondansetron Injectable 4 milliGRAM(s) IV Push every 4 hours PRN Nausea and/or Vomiting          12 Aug 2019 08:31    140    |  100    |  10.0   ----------------------------<  89     3.4     |  25.0   |  0.51     Ca    8.9        12 Aug 2019 08:31  Phos  3.9       12 Aug 2019 08:31  Mg     2.0       12 Aug 2019 08:31      Allergies    CT Dye-Lips and Tongue Swelling (Other)  Feraheme (Other)    Intolerances

## 2019-08-13 ENCOUNTER — TRANSCRIPTION ENCOUNTER (OUTPATIENT)
Age: 64
End: 2019-08-13

## 2019-08-13 LAB
ANION GAP SERPL CALC-SCNC: 14 MMOL/L — SIGNIFICANT CHANGE UP (ref 5–17)
BUN SERPL-MCNC: 12 MG/DL — SIGNIFICANT CHANGE UP (ref 8–20)
CALCIUM SERPL-MCNC: 9.7 MG/DL — SIGNIFICANT CHANGE UP (ref 8.6–10.2)
CHLORIDE SERPL-SCNC: 96 MMOL/L — LOW (ref 98–107)
CO2 SERPL-SCNC: 25 MMOL/L — SIGNIFICANT CHANGE UP (ref 22–29)
CREAT SERPL-MCNC: 0.55 MG/DL — SIGNIFICANT CHANGE UP (ref 0.5–1.3)
GLUCOSE SERPL-MCNC: 92 MG/DL — SIGNIFICANT CHANGE UP (ref 70–115)
POTASSIUM SERPL-MCNC: 3.2 MMOL/L — LOW (ref 3.5–5.3)
POTASSIUM SERPL-SCNC: 3.2 MMOL/L — LOW (ref 3.5–5.3)
SODIUM SERPL-SCNC: 135 MMOL/L — SIGNIFICANT CHANGE UP (ref 135–145)

## 2019-08-13 PROCEDURE — 74018 RADEX ABDOMEN 1 VIEW: CPT | Mod: 26

## 2019-08-13 RX ADMIN — Medication 650 MILLIGRAM(S): at 05:50

## 2019-08-13 RX ADMIN — Medication 650 MILLIGRAM(S): at 00:01

## 2019-08-13 RX ADMIN — PANTOPRAZOLE SODIUM 40 MILLIGRAM(S): 20 TABLET, DELAYED RELEASE ORAL at 05:50

## 2019-08-13 RX ADMIN — Medication 10 MILLIGRAM(S): at 16:06

## 2019-08-13 RX ADMIN — PANTOPRAZOLE SODIUM 40 MILLIGRAM(S): 20 TABLET, DELAYED RELEASE ORAL at 18:16

## 2019-08-13 RX ADMIN — Medication 650 MILLIGRAM(S): at 18:16

## 2019-08-13 NOTE — PROGRESS NOTE ADULT - ASSESSMENT
63 year old female with small bowel obstruction likely due to adhesive disease. GG study done yesterday showed contrast in colon. Patient continues to progress slowly.    -f/u AM KUB  -pain control  -advance diet as tolerated  -serial abdominal exams  -rest of care as per primary team 63 year old female with small bowel obstruction likely due to adhesive disease. GG study done yesterday showed contrast in colon. Patient continues to progress slowly.    - Advanced to reg diary, avoid dairy  - pain control  - serial abdominal exams  - rest of care as per primary team  - Okay to discharge once tolerating diet

## 2019-08-13 NOTE — DISCHARGE NOTE PROVIDER - HOSPITAL COURSE
63-year-old female with history of multiple abdominal surgeries, GERD and stress incontinence who presents to the emergency room with severe abdominal pain nausea and vomiting.  Patient was admitted to the hospital for small bowel obstruction and seen by surgery.  Patient was made nothing by mouth given IV fluids and serial x-rays were performed.  Over the first 5 days after admission patient had episodes of bowel sounds being present as well as passing gas and followed by complete obstruction or partial small bowel obstruction.  Patient then improved and the small bowel obstruction result of it was felt the patient is stable for discharge home.

## 2019-08-13 NOTE — PROGRESS NOTE ADULT - SUBJECTIVE AND OBJECTIVE BOX
SUBJECTIVE    Patient seen and examined by me at 1130 this morning.  Patient feeling well tolerated small route of breakfast.  Patient wants to go home after lunch.  Had BM and passing flatus.    REVIEW OF SYSTEMS    General: Not in any pain	    Skin/Breast: No rash  	  ENMT: No visual problems, no sore throat	    Respiratory and Thorax: No cough, No CP, No SOB  	  Cardiovascular: No CP, No palpitations    Gastrointestinal: No Abd pain, No N/V/D    Musculoskeletal: No Joint pain, No back pain	    Neurological: No headache    Psychiatric: No anxiety      OBJECTIVE    Vital Signs Last 24 Hrs  T(C): 37.1 (08-13-19 @ 17:10), Max: 37.3 (08-12-19 @ 23:59)  T(F): 98.8 (08-13-19 @ 17:10), Max: 99.2 (08-12-19 @ 23:59)  HR: 94 (08-13-19 @ 17:10) (80 - 94)  BP: 136/83 (08-13-19 @ 17:10) (107/68 - 136/83)  BP(mean): --  RR: 18 (08-13-19 @ 17:10) (18 - 18)  SpO2: 96% (08-13-19 @ 17:10) (95% - 97%)    PHYSICAL EXAM:    Constitutional: Not in any distress    Eyes: No conjunctival injection    ENMT: No oral lesions    Neck: No nodes, no adenopathy    Back: Straight, no defects    Respiratory: clear b/l    Cardiovascular: RRR, no murmur    Gastrointestinal: soft, NT, ND, Positive bowel sounds.    Extremities: No edema, no erythema    Neurological: no focal deficit    Skin: No rash      MEDICATIONS  (STANDING):  acetaminophen   Tablet .. 650 milliGRAM(s) Oral every 6 hours  enoxaparin Injectable 40 milliGRAM(s) SubCutaneous daily  pantoprazole  Injectable 40 milliGRAM(s) IV Push two times a day    MEDICATIONS  (PRN):  benzocaine 20% Spray 1 Spray(s) Topical every 6 hours PRN throat irritation  metoclopramide Injectable 10 milliGRAM(s) IV Push every 6 hours PRN n/v  morphine  - Injectable 4 milliGRAM(s) IV Push every 6 hours PRN breakthrough pain  morphine  - Injectable 2 milliGRAM(s) IV Push every 6 hours PRN Mod-severe pain 4-10  ondansetron Injectable 4 milliGRAM(s) IV Push every 4 hours PRN Nausea and/or Vomiting          13 Aug 2019 09:08    135    |  96     |  12.0   ----------------------------<  92     3.2     |  25.0   |  0.55     Ca    9.7        13 Aug 2019 09:08  Phos  3.9       12 Aug 2019 08:31  Mg     2.0       12 Aug 2019 08:31      Allergies    CT Dye-Lips and Tongue Swelling (Other)  Feraheme (Other)    Intolerances

## 2019-08-13 NOTE — PROGRESS NOTE ADULT - SUBJECTIVE AND OBJECTIVE BOX
HPI/OVERNIGHT EVENTS: GG study yesterday revealed contrast in the colon.  No acute events overnight. Patient seen and examined at bedside this AM. She continues being nauseous. Diet is minimally tolerated. Continues passing flatus and having BMs. She denies fever, chills, nausea, vomiting, chest pain, SOB, dizziness, abd pain or any other concerning symptoms    Vital Signs Last 24 Hrs  T(C): 37.3 (12 Aug 2019 23:59), Max: 37.3 (12 Aug 2019 23:59)  T(F): 99.2 (12 Aug 2019 23:59), Max: 99.2 (12 Aug 2019 23:59)  HR: 85 (12 Aug 2019 23:59) (85 - 89)  BP: 107/68 (12 Aug 2019 23:59) (107/68 - 123/84)  BP(mean): --  RR: 18 (12 Aug 2019 23:59) (18 - 18)  SpO2: 95% (12 Aug 2019 23:59) (95% - 99%)      Constitutional: patient resting comfortably in bed, in no acute distress  Respiratory: CTAB respirations are unlabored, no accessory muscle use, no conversational dyspnea  Cardiovascular: regular rate & rhythm   Gastrointestinal: Abdomen soft, non-tender, non-distended, no rebound tenderness / guarding      LABS:    08-12    140  |  100  |  10.0  ----------------------------<  89  3.4<L>   |  25.0  |  0.51    Ca    8.9      12 Aug 2019 08:31  Phos  3.9     08-12  Mg     2.0     08-12      MEDICATIONS  (STANDING):  acetaminophen   Tablet .. 650 milliGRAM(s) Oral every 6 hours  enoxaparin Injectable 40 milliGRAM(s) SubCutaneous daily  pantoprazole  Injectable 40 milliGRAM(s) IV Push two times a day    MEDICATIONS  (PRN):  benzocaine 20% Spray 1 Spray(s) Topical every 6 hours PRN throat irritation  metoclopramide Injectable 10 milliGRAM(s) IV Push every 6 hours PRN n/v  morphine  - Injectable 4 milliGRAM(s) IV Push every 6 hours PRN breakthrough pain  morphine  - Injectable 2 milliGRAM(s) IV Push every 6 hours PRN Mod-severe pain 4-10  ondansetron Injectable 4 milliGRAM(s) IV Push every 4 hours PRN Nausea and/or Vomiting HPI/OVERNIGHT EVENTS: GG study yesterday revealed contrast in the colon in 2hrs.  No acute events overnight. Patient seen and examined at bedside this AM. She feels better this morning. Some nausea last night after creamy soup. Advised to stick to dietary routine. Continues passing flatus and having BMs. She denies fever, chills, nausea, vomiting, chest pain, SOB, dizziness, abd pain or any other concerning symptoms    Vital Signs Last 24 Hrs  T(C): 37.3 (12 Aug 2019 23:59), Max: 37.3 (12 Aug 2019 23:59)  T(F): 99.2 (12 Aug 2019 23:59), Max: 99.2 (12 Aug 2019 23:59)  HR: 85 (12 Aug 2019 23:59) (85 - 89)  BP: 107/68 (12 Aug 2019 23:59) (107/68 - 123/84)  BP(mean): --  RR: 18 (12 Aug 2019 23:59) (18 - 18)  SpO2: 95% (12 Aug 2019 23:59) (95% - 99%)      Constitutional: patient resting comfortably in bed, in no acute distress  Respiratory: CTAB respirations are unlabored, no accessory muscle use, no conversational dyspnea  Cardiovascular: regular rate & rhythm   Gastrointestinal: Abdomen soft, non-tender, non-distended, no rebound tenderness / guarding      LABS:    08-12    140  |  100  |  10.0  ----------------------------<  89  3.4<L>   |  25.0  |  0.51    Ca    8.9      12 Aug 2019 08:31  Phos  3.9     08-12  Mg     2.0     08-12      MEDICATIONS  (STANDING):  acetaminophen   Tablet .. 650 milliGRAM(s) Oral every 6 hours  enoxaparin Injectable 40 milliGRAM(s) SubCutaneous daily  pantoprazole  Injectable 40 milliGRAM(s) IV Push two times a day    MEDICATIONS  (PRN):  benzocaine 20% Spray 1 Spray(s) Topical every 6 hours PRN throat irritation  metoclopramide Injectable 10 milliGRAM(s) IV Push every 6 hours PRN n/v  morphine  - Injectable 4 milliGRAM(s) IV Push every 6 hours PRN breakthrough pain  morphine  - Injectable 2 milliGRAM(s) IV Push every 6 hours PRN Mod-severe pain 4-10  ondansetron Injectable 4 milliGRAM(s) IV Push every 4 hours PRN Nausea and/or Vomiting

## 2019-08-13 NOTE — DISCHARGE NOTE PROVIDER - CARE PROVIDER_API CALL
Simone Marcus)  Family Medicine  [address] [place] [address]  Phone: [phone]  Fax: [phone]  Follow Up Time:

## 2019-08-13 NOTE — PROGRESS NOTE ADULT - SUBJECTIVE AND OBJECTIVE BOX
Feels well ,intolerence to creamy soups,Benign abdominal exam contrast through small intestine to colon in 2 hours ,home on patients routine diet later today.

## 2019-08-14 ENCOUNTER — TRANSCRIPTION ENCOUNTER (OUTPATIENT)
Age: 64
End: 2019-08-14

## 2019-08-14 VITALS
SYSTOLIC BLOOD PRESSURE: 109 MMHG | TEMPERATURE: 98 F | OXYGEN SATURATION: 97 % | RESPIRATION RATE: 18 BRPM | HEART RATE: 83 BPM | DIASTOLIC BLOOD PRESSURE: 74 MMHG

## 2019-08-14 LAB
ANION GAP SERPL CALC-SCNC: 16 MMOL/L — SIGNIFICANT CHANGE UP (ref 5–17)
BUN SERPL-MCNC: 11 MG/DL — SIGNIFICANT CHANGE UP (ref 8–20)
CALCIUM SERPL-MCNC: 9.2 MG/DL — SIGNIFICANT CHANGE UP (ref 8.6–10.2)
CHLORIDE SERPL-SCNC: 97 MMOL/L — LOW (ref 98–107)
CO2 SERPL-SCNC: 25 MMOL/L — SIGNIFICANT CHANGE UP (ref 22–29)
CREAT SERPL-MCNC: 0.56 MG/DL — SIGNIFICANT CHANGE UP (ref 0.5–1.3)
GLUCOSE SERPL-MCNC: 91 MG/DL — SIGNIFICANT CHANGE UP (ref 70–115)
POTASSIUM SERPL-MCNC: 3.2 MMOL/L — LOW (ref 3.5–5.3)
POTASSIUM SERPL-SCNC: 3.2 MMOL/L — LOW (ref 3.5–5.3)
SODIUM SERPL-SCNC: 138 MMOL/L — SIGNIFICANT CHANGE UP (ref 135–145)

## 2019-08-14 PROCEDURE — 80048 BASIC METABOLIC PNL TOTAL CA: CPT

## 2019-08-14 PROCEDURE — 96374 THER/PROPH/DIAG INJ IV PUSH: CPT

## 2019-08-14 PROCEDURE — 81001 URINALYSIS AUTO W/SCOPE: CPT

## 2019-08-14 PROCEDURE — 80053 COMPREHEN METABOLIC PANEL: CPT

## 2019-08-14 PROCEDURE — 85610 PROTHROMBIN TIME: CPT

## 2019-08-14 PROCEDURE — 86901 BLOOD TYPING SEROLOGIC RH(D): CPT

## 2019-08-14 PROCEDURE — 74176 CT ABD & PELVIS W/O CONTRAST: CPT

## 2019-08-14 PROCEDURE — 74018 RADEX ABDOMEN 1 VIEW: CPT

## 2019-08-14 PROCEDURE — 83735 ASSAY OF MAGNESIUM: CPT

## 2019-08-14 PROCEDURE — 71045 X-RAY EXAM CHEST 1 VIEW: CPT

## 2019-08-14 PROCEDURE — 85730 THROMBOPLASTIN TIME PARTIAL: CPT

## 2019-08-14 PROCEDURE — 96375 TX/PRO/DX INJ NEW DRUG ADDON: CPT

## 2019-08-14 PROCEDURE — 96376 TX/PRO/DX INJ SAME DRUG ADON: CPT

## 2019-08-14 PROCEDURE — 85027 COMPLETE CBC AUTOMATED: CPT

## 2019-08-14 PROCEDURE — 82962 GLUCOSE BLOOD TEST: CPT

## 2019-08-14 PROCEDURE — 99285 EMERGENCY DEPT VISIT HI MDM: CPT | Mod: 25

## 2019-08-14 PROCEDURE — 86803 HEPATITIS C AB TEST: CPT

## 2019-08-14 PROCEDURE — 83690 ASSAY OF LIPASE: CPT

## 2019-08-14 PROCEDURE — 83605 ASSAY OF LACTIC ACID: CPT

## 2019-08-14 PROCEDURE — 84100 ASSAY OF PHOSPHORUS: CPT

## 2019-08-14 PROCEDURE — 36415 COLL VENOUS BLD VENIPUNCTURE: CPT

## 2019-08-14 PROCEDURE — 86850 RBC ANTIBODY SCREEN: CPT

## 2019-08-14 PROCEDURE — 86900 BLOOD TYPING SEROLOGIC ABO: CPT

## 2019-08-14 RX ORDER — POTASSIUM CHLORIDE 20 MEQ
20 PACKET (EA) ORAL ONCE
Refills: 0 | Status: COMPLETED | OUTPATIENT
Start: 2019-08-14 | End: 2019-08-14

## 2019-08-14 RX ADMIN — Medication 20 MILLIEQUIVALENT(S): at 10:45

## 2019-08-14 RX ADMIN — PANTOPRAZOLE SODIUM 40 MILLIGRAM(S): 20 TABLET, DELAYED RELEASE ORAL at 05:22

## 2019-08-14 NOTE — PROGRESS NOTE ADULT - PROBLEM SELECTOR PROBLEM 3
Hypokalemia
GERD (gastroesophageal reflux disease)
Small bowel obstruction
Stress incontinence
GERD (gastroesophageal reflux disease)

## 2019-08-14 NOTE — PROGRESS NOTE ADULT - PROBLEM SELECTOR PLAN 2
Continue Protonix
Continue IV Protonix
Continue Protonix.
Continue pantoprazole for now.
Patient refusing potassium supplement by IV or by mouth.  States she will eat up banana today and when she goes home.  Will check BMP in a.m.
cont vesicare at home
Patient with a history of stress incontinence for which she takes Vesicare at home.  Will hold this medicine for another day until bowel function returned to normal.

## 2019-08-14 NOTE — PROGRESS NOTE ADULT - SUBJECTIVE AND OBJECTIVE BOX
SUBJECTIVE    pt feeling better, tolerating po well today, wants to go home    REVIEW OF SYSTEMS    General: Not in any pain	    Skin/Breast: No rash  	  ENMT: No visual problems, no sore throat	    Respiratory and Thorax: No cough, No CP, No SOB  	  Cardiovascular: No CP, No palpitations    Gastrointestinal: No Abd pain, No N/V/D    Musculoskeletal: No Joint pain, No back pain	    Neurological: No headache    Psychiatric: No anxiety      OBJECTIVE    Vital Signs Last 24 Hrs  T(C): 36.8 (08-14-19 @ 08:10), Max: 37.1 (08-13-19 @ 17:10)  T(F): 98.2 (08-14-19 @ 08:10), Max: 98.8 (08-13-19 @ 17:10)  HR: 83 (08-14-19 @ 08:10) (83 - 94)  BP: 109/74 (08-14-19 @ 08:10) (109/74 - 136/83)  BP(mean): --  RR: 18 (08-14-19 @ 08:10) (18 - 18)  SpO2: 97% (08-14-19 @ 08:10) (96% - 99%)    PHYSICAL EXAM:    Constitutional: Not in any distress    Eyes: No conjunctival injection    ENMT: No oral lesions    Neck: No nodes, no adenopathy    Back: Straight, no defects    Respiratory: clear b/l    Cardiovascular: RRR, no murmur    Gastrointestinal: soft, NT, ND, + bs    Extremities: No edema, no erythema    Neurological: no focal deficit    Skin: No rash      MEDICATIONS  (STANDING):  acetaminophen   Tablet .. 650 milliGRAM(s) Oral every 6 hours  enoxaparin Injectable 40 milliGRAM(s) SubCutaneous daily  pantoprazole  Injectable 40 milliGRAM(s) IV Push two times a day  potassium chloride    Tablet ER 20 milliEquivalent(s) Oral once    MEDICATIONS  (PRN):  benzocaine 20% Spray 1 Spray(s) Topical every 6 hours PRN throat irritation  metoclopramide Injectable 10 milliGRAM(s) IV Push every 6 hours PRN n/v  morphine  - Injectable 4 milliGRAM(s) IV Push every 6 hours PRN breakthrough pain  morphine  - Injectable 2 milliGRAM(s) IV Push every 6 hours PRN Mod-severe pain 4-10  ondansetron Injectable 4 milliGRAM(s) IV Push every 4 hours PRN Nausea and/or Vomiting          14 Aug 2019 07:45    138    |  97     |  11.0   ----------------------------<  91     3.2     |  25.0   |  0.56     Ca    9.2        14 Aug 2019 07:45      Allergies    CT Dye-Lips and Tongue Swelling (Other)  Feraheme (Other)    Intolerances

## 2019-08-14 NOTE — PROGRESS NOTE ADULT - PROBLEM SELECTOR PROBLEM 2
GERD (gastroesophageal reflux disease)
Hypokalemia
Stress incontinence
Stress incontinence

## 2019-08-14 NOTE — PROGRESS NOTE ADULT - PROBLEM SELECTOR PROBLEM 1
Small bowel obstruction
Hypokalemia
Small bowel obstruction

## 2019-08-14 NOTE — PROGRESS NOTE ADULT - PROBLEM SELECTOR PLAN 3
IV fluids have been discontinued; will check BMP in a.m.
Continue Protonix.
We will consider restarting home medication tomorrow if diet improves.
We will hold off any medications that she takes at home, DC IV fluids for now.
Would continue to hold off on any medications.  Urinalysis appears negative for infection.
appears improved and resolved; for d/c home
Patient with history of GERD and takes Dexilant at home.  Continue IV pantoprazole twice a day for now.

## 2019-08-14 NOTE — PROGRESS NOTE ADULT - ASSESSMENT
63 year old female with small bowel obstruction likely due to adhesive disease. GG study done yesterday showed contrast in colon. Patient continues to progress slowly.    - Reg diet, avoid dairy  - pain and nausea control  - rest of care as per primary team  - Okay to discharge if tolerating diet today

## 2019-08-14 NOTE — DISCHARGE NOTE NURSING/CASE MANAGEMENT/SOCIAL WORK - [email or web address]_GEN_[email or web address]_CORE
You can access the [email or web address], offered by [facility], by registering with the following website: [email or web address]

## 2019-08-14 NOTE — PROGRESS NOTE ADULT - SUBJECTIVE AND OBJECTIVE BOX
HPI/OVERNIGHT EVENTS:  No acute events overnight. Patient seen and examined at bedside. Had nausea and flatus and belching last night a few hours after having soup. Discussed with primary team and felt more comfortable with another night.  Continues passing flatus and having BMs. Ambulating independently She feels better this morning. She denies fever, chills, nausea, vomiting, chest pain, SOB, dizziness, abd pain or any other concerning symptoms      MEDICATIONS  (STANDING):  acetaminophen   Tablet .. 650 milliGRAM(s) Oral every 6 hours  enoxaparin Injectable 40 milliGRAM(s) SubCutaneous daily  pantoprazole  Injectable 40 milliGRAM(s) IV Push two times a day    MEDICATIONS  (PRN):  benzocaine 20% Spray 1 Spray(s) Topical every 6 hours PRN throat irritation  metoclopramide Injectable 10 milliGRAM(s) IV Push every 6 hours PRN n/v  morphine  - Injectable 4 milliGRAM(s) IV Push every 6 hours PRN breakthrough pain  morphine  - Injectable 2 milliGRAM(s) IV Push every 6 hours PRN Mod-severe pain 4-10  ondansetron Injectable 4 milliGRAM(s) IV Push every 4 hours PRN Nausea and/or Vomiting      Vital Signs Last 24 Hrs  T(C): 36.7 (13 Aug 2019 23:51), Max: 37.1 (13 Aug 2019 17:10)  T(F): 98 (13 Aug 2019 23:51), Max: 98.8 (13 Aug 2019 17:10)  HR: 83 (13 Aug 2019 23:51) (80 - 94)  BP: 125/72 (13 Aug 2019 23:51) (125/72 - 136/83)  BP(mean): --  RR: 18 (13 Aug 2019 23:51) (18 - 18)  SpO2: 99% (13 Aug 2019 23:51) (96% - 99%)    Constitutional: patient resting comfortably in bed, in no acute distress  Respiratory: respirations are unlabored, no accessory muscle use, no conversational dyspnea  Cardiovascular: regular rate & rhythm  Gastrointestinal: Abdomen soft, non-tender, non-distended, no rebound tenderness / guarding  Neurological: GCS: 15 (4/5/6). A&O x 3; no gross sensory / motor / coordination deficits  Musculoskeletal: No joint pain, swelling or deformity; no limitation of movement      I&O's Detail      LABS:    08-13    135  |  96<L>  |  12.0  ----------------------------<  92  3.2<L>   |  25.0  |  0.55    Ca    9.7      13 Aug 2019 09:08  Phos  3.9     08-12  Mg     2.0     08-12

## 2019-08-14 NOTE — PROGRESS NOTE ADULT - PROBLEM SELECTOR PLAN 1
.  Improved this morning, first abdominal film done this morning showed no obstruction; patient later developed abdominal pain and vomiting, had repeat abdominal x-ray which showed partial small bowel obstruction.  Will change to clear liquid diet and reassess in a.m.
He is improving.  Would continue clear liquid diet for today and consider advancing diet in a.m.
Patient appears improving, advance to clear liquid diet.
Patient with KUB done this morning showing small bowel obstruction.  Patient also with hypoactive bowel sounds, but looks good and is feeling well.  Advised patient to take clear liquids only today and only in small amounts.  Continue IV fluids and check BMP in a.m.
Serial abdominal exams  monitor NG output  pain control as needed  IV hydration  encourage oob  DVT ppx  once bf returns likely can DC ng and advance diet  slowly
Would recommend continued surveillance as patient appears to have worsening symptoms in the afternoon.  Each day it does seem to be improved however.
will give K-dur 20 mEq x 1 now
Plan is to apply clamp to NG tube for 6 hours and reevaluate in the evening.  Possible discontinue NGT and advance diet to clear liquids.  Management per surgical team.

## 2019-09-17 ENCOUNTER — OUTPATIENT (OUTPATIENT)
Dept: OUTPATIENT SERVICES | Facility: HOSPITAL | Age: 64
LOS: 1 days | End: 2019-09-17
Payer: COMMERCIAL

## 2019-09-17 ENCOUNTER — APPOINTMENT (OUTPATIENT)
Dept: MRI IMAGING | Facility: CLINIC | Age: 64
End: 2019-09-17
Payer: COMMERCIAL

## 2019-09-17 DIAGNOSIS — Z00.00 ENCOUNTER FOR GENERAL ADULT MEDICAL EXAMINATION WITHOUT ABNORMAL FINDINGS: ICD-10-CM

## 2019-09-17 DIAGNOSIS — N63 UNSPECIFIED LUMP IN BREAST: Chronic | ICD-10-CM

## 2019-09-17 DIAGNOSIS — Z98.89 OTHER SPECIFIED POSTPROCEDURAL STATES: Chronic | ICD-10-CM

## 2019-09-17 DIAGNOSIS — Z98.82 BREAST IMPLANT STATUS: Chronic | ICD-10-CM

## 2019-09-17 PROCEDURE — A9585: CPT

## 2019-09-17 PROCEDURE — 72197 MRI PELVIS W/O & W/DYE: CPT

## 2019-09-17 PROCEDURE — 74183 MRI ABD W/O CNTR FLWD CNTR: CPT | Mod: 26

## 2019-09-17 PROCEDURE — 74183 MRI ABD W/O CNTR FLWD CNTR: CPT

## 2019-09-17 PROCEDURE — 72197 MRI PELVIS W/O & W/DYE: CPT | Mod: 26

## 2020-03-16 NOTE — ED ADULT TRIAGE NOTE - DIRECT TO ROOM CARE INITIATED:
He had a bilateral carotid imaging on 3/16/2020 that revealed deep venous insufficiency.  Negative for bilateral DVT.   17-Jul-2019 20:04

## 2020-04-04 ENCOUNTER — TRANSCRIPTION ENCOUNTER (OUTPATIENT)
Age: 65
End: 2020-04-04

## 2020-04-25 ENCOUNTER — MESSAGE (OUTPATIENT)
Age: 65
End: 2020-04-25

## 2020-05-01 LAB
SARS-COV-2 IGG SERPL IA-ACNC: 0.5 RATIO
SARS-COV-2 IGG SERPL QL IA: NEGATIVE

## 2020-05-28 NOTE — H&P ADULT - NSCORESITESY/N_GEN_A_CORE_RD
Carbohydrate controlled, low sodium diet.  Exercise goal:  30 minutes per day, 3-5 days per week.   No

## 2020-06-08 ENCOUNTER — APPOINTMENT (OUTPATIENT)
Dept: UROGYNECOLOGY | Facility: CLINIC | Age: 65
End: 2020-06-08
Payer: COMMERCIAL

## 2020-06-08 DIAGNOSIS — R32 UNSPECIFIED URINARY INCONTINENCE: ICD-10-CM

## 2020-06-08 PROCEDURE — 99213 OFFICE O/P EST LOW 20 MIN: CPT | Mod: 95

## 2020-06-08 NOTE — DISCUSSION/SUMMARY
[FreeTextEntry1] : Counseling regarding anti-cholinergic medication: This patient has been prescribed or is currently on an anti-cholinergic medication for treatment of overactive bladder/urge urinary incontinence. The patient was screened for contraindications to use. We reviewed indication for use, potential common side effects, and instructions for use. We reviewed association of anti-cholinergic medication use with increased risk of developing dementia. Alternatives to anti-cholinergic medication and different anti-cholinergic medications were discussed, including theoretical decreased risk with certain anti-cholinergics, and offered as appropriate for the patient. The patient expressed her understanding of the counseling provided. The patient's expressed preference is:   Trial of mirabegron. We discussed side effects. I send and Rx to her pharmacy. I also emailed her some literature on BTX and PTNS.   \par

## 2020-06-08 NOTE — REASON FOR VISIT
[Urinary Incontinence] : urinary incontinence [Urine Frequency] : urine frequency [Urinary Urgency] : urinary urgency

## 2020-06-08 NOTE — HISTORY OF PRESENT ILLNESS
[Home] : at home, [unfilled] , at the time of the visit. [Medical Office: ([facility])___] : at the medical office located in  [Verbal consent obtained from patient] : the patient, [unfilled] [FreeTextEntry1] : Calling for refills. Found that the vesicare was not working quite as well so she is taking 1 and 1/2 tablets and it is helping her bladder control.

## 2020-06-29 LAB
APPEARANCE: CLEAR
BACTERIA UR CULT: NORMAL
BACTERIA: NEGATIVE
BILIRUBIN URINE: NEGATIVE
BLOOD URINE: NEGATIVE
COLOR: YELLOW
CORE LAB FLUID CYTOLOGY: NORMAL
GLUCOSE QUALITATIVE U: NORMAL MG/DL
KETONES URINE: NEGATIVE
LEUKOCYTE ESTERASE URINE: NEGATIVE
MICROSCOPIC-UA: NORMAL
NITRITE URINE: NEGATIVE
PH URINE: 6.5
PROTEIN URINE: NEGATIVE MG/DL
RED BLOOD CELLS URINE: 3 /HPF
SPECIFIC GRAVITY URINE: 1
SQUAMOUS EPITHELIAL CELLS: 0 /HPF
UROBILINOGEN URINE: NORMAL MG/DL
WHITE BLOOD CELLS URINE: 1 /HPF

## 2020-07-21 ENCOUNTER — APPOINTMENT (OUTPATIENT)
Dept: ULTRASOUND IMAGING | Facility: CLINIC | Age: 65
End: 2020-07-21
Payer: COMMERCIAL

## 2020-07-21 ENCOUNTER — APPOINTMENT (OUTPATIENT)
Dept: MAMMOGRAPHY | Facility: CLINIC | Age: 65
End: 2020-07-21
Payer: COMMERCIAL

## 2020-07-21 ENCOUNTER — OUTPATIENT (OUTPATIENT)
Dept: OUTPATIENT SERVICES | Facility: HOSPITAL | Age: 65
LOS: 1 days | End: 2020-07-21
Payer: COMMERCIAL

## 2020-07-21 DIAGNOSIS — Z98.82 BREAST IMPLANT STATUS: Chronic | ICD-10-CM

## 2020-07-21 DIAGNOSIS — Z98.89 OTHER SPECIFIED POSTPROCEDURAL STATES: Chronic | ICD-10-CM

## 2020-07-21 DIAGNOSIS — Z85.3 PERSONAL HISTORY OF MALIGNANT NEOPLASM OF BREAST: ICD-10-CM

## 2020-07-21 DIAGNOSIS — N63 UNSPECIFIED LUMP IN BREAST: Chronic | ICD-10-CM

## 2020-07-21 DIAGNOSIS — Z12.31 ENCOUNTER FOR SCREENING MAMMOGRAM FOR MALIGNANT NEOPLASM OF BREAST: ICD-10-CM

## 2020-07-21 PROCEDURE — 76641 ULTRASOUND BREAST COMPLETE: CPT | Mod: 26,LT

## 2020-07-21 PROCEDURE — 77063 BREAST TOMOSYNTHESIS BI: CPT | Mod: 26,52

## 2020-07-21 PROCEDURE — 77063 BREAST TOMOSYNTHESIS BI: CPT

## 2020-07-21 PROCEDURE — 77067 SCR MAMMO BI INCL CAD: CPT

## 2020-07-21 PROCEDURE — 77067 SCR MAMMO BI INCL CAD: CPT | Mod: 26,LT,52

## 2020-07-21 PROCEDURE — 76641 ULTRASOUND BREAST COMPLETE: CPT

## 2020-07-30 NOTE — ED ADULT TRIAGE NOTE - CHIEF COMPLAINT QUOTE
Pt states she was at work when her left middle finger started to throb, numbness, and swelling.  Pt has a hx of arthritis.  Pt felt dizzy when she woke up last night and took some sinus meds today.
Patient

## 2020-12-02 ENCOUNTER — EMERGENCY (EMERGENCY)
Facility: HOSPITAL | Age: 65
LOS: 1 days | Discharge: HOME | End: 2020-12-02
Attending: EMERGENCY MEDICINE
Payer: COMMERCIAL

## 2020-12-02 VITALS
TEMPERATURE: 98 F | HEART RATE: 86 BPM | HEIGHT: 62 IN | OXYGEN SATURATION: 97 % | DIASTOLIC BLOOD PRESSURE: 87 MMHG | SYSTOLIC BLOOD PRESSURE: 152 MMHG | RESPIRATION RATE: 18 BRPM | WEIGHT: 160.06 LBS

## 2020-12-02 DIAGNOSIS — N63 UNSPECIFIED LUMP IN BREAST: Chronic | ICD-10-CM

## 2020-12-02 DIAGNOSIS — Z98.89 OTHER SPECIFIED POSTPROCEDURAL STATES: Chronic | ICD-10-CM

## 2020-12-02 DIAGNOSIS — Z98.82 BREAST IMPLANT STATUS: Chronic | ICD-10-CM

## 2020-12-02 PROCEDURE — 93971 EXTREMITY STUDY: CPT | Mod: 26,LT

## 2020-12-02 PROCEDURE — 93971 EXTREMITY STUDY: CPT

## 2020-12-02 PROCEDURE — 99284 EMERGENCY DEPT VISIT MOD MDM: CPT | Mod: 25

## 2020-12-02 PROCEDURE — 99284 EMERGENCY DEPT VISIT MOD MDM: CPT

## 2020-12-02 NOTE — ED STATDOCS - PATIENT PORTAL LINK FT
You can access the FollowMyHealth Patient Portal offered by [facility] by registering at the following website: [email or web address]. By joining [facility]’s FollowMyHealth portal, you will also be able to view your health information using other applications (apps) compatible with our system.

## 2020-12-02 NOTE — ED STATDOCS - PROGRESS NOTE DETAILS
PT evaluated by intake physician. HPI/PE/ROS as noted above. Will follow up plan per intake physician. Pt has LLE pain and swelling since yesterday. Will await US. US negative for dvt. Will hang.

## 2020-12-02 NOTE — ED STATDOCS - OBJECTIVE STATEMENT
66 y/o F pt with significant PMHx of anemia, arthritis, asthma, hiatal hernia, breast CA, gastritis, and SBO presents to the ED c/o left calf pain since yesterday. Took ASA last night. Denies hx of clots. Denies f/c/n/v/cp/sob/palpitations/ cough/rash/headache/dizziness/abd.pain/d/c/dysuria/hematuria/recent travel/recent surgeries/trauma. Never had COVID. Allergic to CT dye and Feraheme. No further complaints at this time. 66 y/o F pt with significant PMHx of anemia, arthritis, asthma, hiatal hernia, breast CA, gastritis, and SBO presents to the ED c/o left calf pain since yesterday. Took ASA last night. Denies hx of clots. Denies f/c/n/v/cp/sob/palpitations/ cough/rash/headache/dizziness/abd.pain/d/c/dysuria/hematuria/recent travel/recent surgeries/trauma. Never had COVID. Allergic to CT dye and Feraheme. No further complaints at this time. no hx of dvt/pe

## 2020-12-02 NOTE — ED STATDOCS - PHYSICAL EXAMINATION
Head: atraumatic, normacephalic  Face: atraumatic, no crepitus no orbiral/maxillary/mandibular ttp  throat: uvula midline no exudates  eyes: perrla eomi  heart: rrr s1s2  lungs: ctab  abd: soft, nt nd +bs no rebound/guarding no cva ttp  skin: warm  LE: 2+dp pulse, cap refill 2 seconds, warm toes, no redness. Mild swelling and ttp to left calf.   back: no midline cervical/thoracic/lumbar ttp Head: atraumatic, normacephalic  Face: atraumatic, no crepitus no orbiral/maxillary/mandibular ttp  throat: uvula midline no exudates  eyes: perrla eomi  heart: rrr s1s2  lungs: ctab  abd: soft, nt nd +bs no rebound/guarding no cva ttp  skin: warm  LE: 2+dp pulse, cap refill 2 seconds, warm toes, no redness. Mild swelling and ttp to left calf. no redness no increased warmth  back: no midline cervical/thoracic/lumbar ttp

## 2020-12-02 NOTE — ED STATDOCS - ATTENDING CONTRIBUTION TO CARE
I, Yesy Eldridge, performed the initial face to face bedside interview with this patient regarding history of present illness, review of symptoms and relevant past medical, social and family history.  I completed an independent physical examination.  I was the initial provider who evaluated this patient. I have signed out the follow up of any pending tests (i.e. labs, radiological studies) to the ACP.  I have communicated the patient’s plan of care and disposition with the ACP.

## 2021-01-01 NOTE — ED ADULT NURSE NOTE - CAS TRG GENERAL NORM CIRC DET
Early breastfeeding management per full term guidelines, Signs and components of effective feeding reviewed, discussed minimum daily intake and output, encouraged use of feeding log./initiate/review early breastfeeding management guidelines
Early breastfeeding management per full term guidelines, Signs and components of effective feeding reviewed, discussed minimum daily intake and output, encouraged use of feeding log. Mom will offer breast each feeding, follow with double pumping 20-30minutes and supplement with expressed breast milk/formula every 3 hours, F/U with pediatrician recommended within 48 hrs for review of feedings and weight check./initiate dual electric pump routine/initiate/review early breastfeeding management guidelines
Strong peripheral pulses

## 2021-05-28 ENCOUNTER — INPATIENT (INPATIENT)
Facility: HOSPITAL | Age: 66
LOS: 2 days | Discharge: HOME | DRG: 390 | End: 2021-05-31
Attending: SURGERY | Admitting: SURGERY
Payer: COMMERCIAL

## 2021-05-28 VITALS
RESPIRATION RATE: 18 BRPM | HEART RATE: 84 BPM | TEMPERATURE: 98 F | DIASTOLIC BLOOD PRESSURE: 75 MMHG | WEIGHT: 154.1 LBS | SYSTOLIC BLOOD PRESSURE: 136 MMHG | HEIGHT: 62 IN | OXYGEN SATURATION: 97 %

## 2021-05-28 DIAGNOSIS — N63 UNSPECIFIED LUMP IN BREAST: Chronic | ICD-10-CM

## 2021-05-28 DIAGNOSIS — K56.600 PARTIAL INTESTINAL OBSTRUCTION, UNSPECIFIED AS TO CAUSE: ICD-10-CM

## 2021-05-28 DIAGNOSIS — Z98.89 OTHER SPECIFIED POSTPROCEDURAL STATES: Chronic | ICD-10-CM

## 2021-05-28 DIAGNOSIS — Z98.82 BREAST IMPLANT STATUS: Chronic | ICD-10-CM

## 2021-05-28 LAB
ALBUMIN SERPL ELPH-MCNC: 4.2 G/DL — SIGNIFICANT CHANGE UP (ref 3.3–5.2)
ALP SERPL-CCNC: 76 U/L — SIGNIFICANT CHANGE UP (ref 40–120)
ALT FLD-CCNC: 12 U/L — SIGNIFICANT CHANGE UP
ANION GAP SERPL CALC-SCNC: 11 MMOL/L — SIGNIFICANT CHANGE UP (ref 5–17)
AST SERPL-CCNC: 17 U/L — SIGNIFICANT CHANGE UP
BASOPHILS # BLD AUTO: 0.03 K/UL — SIGNIFICANT CHANGE UP (ref 0–0.2)
BASOPHILS NFR BLD AUTO: 0.3 % — SIGNIFICANT CHANGE UP (ref 0–2)
BILIRUB SERPL-MCNC: 0.3 MG/DL — LOW (ref 0.4–2)
BUN SERPL-MCNC: 15.3 MG/DL — SIGNIFICANT CHANGE UP (ref 8–20)
CALCIUM SERPL-MCNC: 9.5 MG/DL — SIGNIFICANT CHANGE UP (ref 8.6–10.2)
CHLORIDE SERPL-SCNC: 98 MMOL/L — SIGNIFICANT CHANGE UP (ref 98–107)
CO2 SERPL-SCNC: 29 MMOL/L — SIGNIFICANT CHANGE UP (ref 22–29)
CREAT SERPL-MCNC: 0.63 MG/DL — SIGNIFICANT CHANGE UP (ref 0.5–1.3)
EOSINOPHIL # BLD AUTO: 0.04 K/UL — SIGNIFICANT CHANGE UP (ref 0–0.5)
EOSINOPHIL NFR BLD AUTO: 0.4 % — SIGNIFICANT CHANGE UP (ref 0–6)
GLUCOSE SERPL-MCNC: 97 MG/DL — SIGNIFICANT CHANGE UP (ref 70–99)
HCT VFR BLD CALC: 43.3 % — SIGNIFICANT CHANGE UP (ref 34.5–45)
HGB BLD-MCNC: 13.9 G/DL — SIGNIFICANT CHANGE UP (ref 11.5–15.5)
IMM GRANULOCYTES NFR BLD AUTO: 0.3 % — SIGNIFICANT CHANGE UP (ref 0–1.5)
LIDOCAIN IGE QN: 18 U/L — LOW (ref 22–51)
LYMPHOCYTES # BLD AUTO: 1.32 K/UL — SIGNIFICANT CHANGE UP (ref 1–3.3)
LYMPHOCYTES # BLD AUTO: 14.3 % — SIGNIFICANT CHANGE UP (ref 13–44)
MCHC RBC-ENTMCNC: 29.3 PG — SIGNIFICANT CHANGE UP (ref 27–34)
MCHC RBC-ENTMCNC: 32.1 GM/DL — SIGNIFICANT CHANGE UP (ref 32–36)
MCV RBC AUTO: 91.4 FL — SIGNIFICANT CHANGE UP (ref 80–100)
MONOCYTES # BLD AUTO: 0.46 K/UL — SIGNIFICANT CHANGE UP (ref 0–0.9)
MONOCYTES NFR BLD AUTO: 5 % — SIGNIFICANT CHANGE UP (ref 2–14)
NEUTROPHILS # BLD AUTO: 7.35 K/UL — SIGNIFICANT CHANGE UP (ref 1.8–7.4)
NEUTROPHILS NFR BLD AUTO: 79.7 % — HIGH (ref 43–77)
PLATELET # BLD AUTO: 256 K/UL — SIGNIFICANT CHANGE UP (ref 150–400)
POTASSIUM SERPL-MCNC: 4.1 MMOL/L — SIGNIFICANT CHANGE UP (ref 3.5–5.3)
POTASSIUM SERPL-SCNC: 4.1 MMOL/L — SIGNIFICANT CHANGE UP (ref 3.5–5.3)
PROT SERPL-MCNC: 7.5 G/DL — SIGNIFICANT CHANGE UP (ref 6.6–8.7)
RBC # BLD: 4.74 M/UL — SIGNIFICANT CHANGE UP (ref 3.8–5.2)
RBC # FLD: 12.2 % — SIGNIFICANT CHANGE UP (ref 10.3–14.5)
SARS-COV-2 RNA SPEC QL NAA+PROBE: SIGNIFICANT CHANGE UP
SODIUM SERPL-SCNC: 138 MMOL/L — SIGNIFICANT CHANGE UP (ref 135–145)
WBC # BLD: 9.23 K/UL — SIGNIFICANT CHANGE UP (ref 3.8–10.5)
WBC # FLD AUTO: 9.23 K/UL — SIGNIFICANT CHANGE UP (ref 3.8–10.5)

## 2021-05-28 PROCEDURE — G1004: CPT

## 2021-05-28 PROCEDURE — 99285 EMERGENCY DEPT VISIT HI MDM: CPT

## 2021-05-28 PROCEDURE — 74176 CT ABD & PELVIS W/O CONTRAST: CPT | Mod: 26,ME,59

## 2021-05-28 PROCEDURE — 93010 ELECTROCARDIOGRAM REPORT: CPT

## 2021-05-28 PROCEDURE — 74019 RADEX ABDOMEN 2 VIEWS: CPT | Mod: 26

## 2021-05-28 PROCEDURE — 71045 X-RAY EXAM CHEST 1 VIEW: CPT | Mod: 26

## 2021-05-28 RX ORDER — MELOXICAM 15 MG/1
0 TABLET ORAL
Qty: 0 | Refills: 0 | DISCHARGE

## 2021-05-28 RX ORDER — MIRABEGRON 50 MG/1
0 TABLET, EXTENDED RELEASE ORAL
Qty: 0 | Refills: 0 | DISCHARGE

## 2021-05-28 RX ORDER — SODIUM CHLORIDE 9 MG/ML
1000 INJECTION INTRAMUSCULAR; INTRAVENOUS; SUBCUTANEOUS ONCE
Refills: 0 | Status: COMPLETED | OUTPATIENT
Start: 2021-05-28 | End: 2021-05-28

## 2021-05-28 RX ORDER — PANTOPRAZOLE SODIUM 20 MG/1
40 TABLET, DELAYED RELEASE ORAL DAILY
Refills: 0 | Status: DISCONTINUED | OUTPATIENT
Start: 2021-05-28 | End: 2021-05-31

## 2021-05-28 RX ORDER — VILAZODONE HYDROCHLORIDE 20 MG/1
1 TABLET, FILM COATED ORAL
Qty: 0 | Refills: 0 | DISCHARGE

## 2021-05-28 RX ORDER — SODIUM CHLORIDE 9 MG/ML
1000 INJECTION, SOLUTION INTRAVENOUS
Refills: 0 | Status: DISCONTINUED | OUTPATIENT
Start: 2021-05-28 | End: 2021-05-30

## 2021-05-28 RX ORDER — IOHEXOL 300 MG/ML
30 INJECTION, SOLUTION INTRAVENOUS ONCE
Refills: 0 | Status: COMPLETED | OUTPATIENT
Start: 2021-05-28 | End: 2021-05-28

## 2021-05-28 RX ORDER — ONDANSETRON 8 MG/1
4 TABLET, FILM COATED ORAL EVERY 6 HOURS
Refills: 0 | Status: DISCONTINUED | OUTPATIENT
Start: 2021-05-28 | End: 2021-05-31

## 2021-05-28 RX ORDER — ENOXAPARIN SODIUM 100 MG/ML
40 INJECTION SUBCUTANEOUS AT BEDTIME
Refills: 0 | Status: DISCONTINUED | OUTPATIENT
Start: 2021-05-28 | End: 2021-05-31

## 2021-05-28 RX ORDER — VILAZODONE HYDROCHLORIDE 20 MG/1
0 TABLET, FILM COATED ORAL
Qty: 0 | Refills: 0 | DISCHARGE

## 2021-05-28 RX ORDER — ALBUTEROL 90 UG/1
0 AEROSOL, METERED ORAL
Qty: 0 | Refills: 3 | DISCHARGE

## 2021-05-28 RX ORDER — ALBUTEROL 90 UG/1
2 AEROSOL, METERED ORAL EVERY 6 HOURS
Refills: 0 | Status: DISCONTINUED | OUTPATIENT
Start: 2021-05-28 | End: 2021-05-31

## 2021-05-28 RX ORDER — MORPHINE SULFATE 50 MG/1
4 CAPSULE, EXTENDED RELEASE ORAL ONCE
Refills: 0 | Status: DISCONTINUED | OUTPATIENT
Start: 2021-05-28 | End: 2021-05-28

## 2021-05-28 RX ORDER — ACETAMINOPHEN 500 MG
650 TABLET ORAL EVERY 6 HOURS
Refills: 0 | Status: DISCONTINUED | OUTPATIENT
Start: 2021-05-28 | End: 2021-05-29

## 2021-05-28 RX ORDER — METOCLOPRAMIDE HCL 10 MG
10 TABLET ORAL ONCE
Refills: 0 | Status: COMPLETED | OUTPATIENT
Start: 2021-05-28 | End: 2021-05-28

## 2021-05-28 RX ORDER — SOLIFENACIN SUCCINATE 10 MG/1
1 TABLET ORAL
Qty: 0 | Refills: 0 | DISCHARGE

## 2021-05-28 RX ORDER — DEXLANSOPRAZOLE 30 MG/1
0 CAPSULE, DELAYED RELEASE ORAL
Qty: 0 | Refills: 1 | DISCHARGE

## 2021-05-28 RX ADMIN — IOHEXOL 30 MILLILITER(S): 300 INJECTION, SOLUTION INTRAVENOUS at 14:40

## 2021-05-28 RX ADMIN — SODIUM CHLORIDE 1000 MILLILITER(S): 9 INJECTION INTRAMUSCULAR; INTRAVENOUS; SUBCUTANEOUS at 14:55

## 2021-05-28 RX ADMIN — MORPHINE SULFATE 4 MILLIGRAM(S): 50 CAPSULE, EXTENDED RELEASE ORAL at 14:55

## 2021-05-28 RX ADMIN — ENOXAPARIN SODIUM 40 MILLIGRAM(S): 100 INJECTION SUBCUTANEOUS at 22:20

## 2021-05-28 RX ADMIN — Medication 10 MILLIGRAM(S): at 19:48

## 2021-05-28 RX ADMIN — MORPHINE SULFATE 4 MILLIGRAM(S): 50 CAPSULE, EXTENDED RELEASE ORAL at 20:36

## 2021-05-28 RX ADMIN — SODIUM CHLORIDE 1000 MILLILITER(S): 9 INJECTION INTRAMUSCULAR; INTRAVENOUS; SUBCUTANEOUS at 19:21

## 2021-05-28 NOTE — ED PROVIDER NOTE - CLINICAL SUMMARY MEDICAL DECISION MAKING FREE TEXT BOX
Pt is a 66 y/o F pt with significant PMHx of anemia, arthritis, asthma, hiatal hernia, breast CA, gastritis, and SBO presenting for abd pain and distention since yesterday. Will check labs/CT and reassess.

## 2021-05-28 NOTE — H&P ADULT - ASSESSMENT
66yo female w/ history of ventral incisional hernia repair and SBO presenting with recurrent partial small bowel obstruction. Patient w/o peritonitis or any indication of need for acute surgical intervention at this time, will proceed with trial of nonoperative management.     - NPO w/ IVF  - NGT to low continuous suction  - serial abd exams  - likely attemtpt gastrograffin challenge 5/29 am  - Admit to surgery under Dr. Tc Persaud.

## 2021-05-28 NOTE — H&P ADULT - HISTORY OF PRESENT ILLNESS
General Surgery Consult Note    HPI:   66yo Female w/ PMH Right mastectomy w/ tram flap reconstruction, laparoscopic ventral incisional hernia repair, and recurrent SBO presents on 05-28-21 w/ complaint of abdominal pain and distension. Patient states that for last 2 days she has had nausea, poor appetitie, and LLQ abdominal pain associated w/ episodes of emesis. Patient reports symptoms are similar to those experienced during prior admission for SBO 1 year ago. That episode resolved with nonoperative management. .     PMH:  SBO (small bowel obstruction)  Breast CA, right s/p mastectomy  Anemia  Asthma  Anemia  GERD  Arthritis    PSH:  Right mastectomy w/ TRAM flap reconstruction  Laparoscopic ventral incisional hernia repair, lysis of adhesions (Dr. Moreno, 2019)    Home Medications:  ALBUTEROL HFA INH (200 PUFFS) 6.7GM: INL 2 PFS PO Q 4 H PRN  Dexilant 60 mg oral delayed release capsule: 1 cap(s) orally once a day  DEXILANT 60MG CAP(FORMERLY KAPIDEX): TAKE 1 CAPSULE BY MOUTH DAILY  MELOXICAM  15 MG TABS:   MYRBETRIQ  25 MG TB24:   Viibryd 20 mg oral tablet: 1 tab(s) orally once a day    ALL:  CT Dye-Lips and Tongue Swelling (Other)  Feraheme (Other)      FMH:  Sister recently diagnosed with metastatic pancreatic cancer.     SOC Hx:   Denies etoh, tobacco, or drug use     Physical Exam:   Gen: well appearing female, NAD  Neuro: AOx3, EOMI, PERRLA, no gross deficit on exam  HEENT: No oral/mucosal lesions, no neck masses or lymphadenopathy  RESP: CTABL, nonlabored breathing  CVS: RRR,   GI: abd soft, mildly tender to palpation in LLQ, Moderate distension, no rebound/guarding. Large ventral hernia in LLQ appreciated on exam.   Extremities: 2+ peripheral pulses

## 2021-05-28 NOTE — ED PROVIDER NOTE - OBJECTIVE STATEMENT
Pt is a 66 y/o F pt with significant PMHx of anemia, arthritis, asthma, hiatal hernia, breast CA, gastritis, and SBO presenting for LLQ abd pain and distention starting yesterday. Pt states she has hx of SBO and feels similar. Last SBO was 2 years ago. Pt reports having diarrhea this morning. She has not passed gas today. Pt had coffee and half a cup of broth today. She denies nausea/vomiting/chills/fever/chest pain. No other complaints at this time.

## 2021-05-28 NOTE — ED ADULT TRIAGE NOTE - CHIEF COMPLAINT QUOTE
Pt has history of bowel obstructions and thinks she has a bowel obstruction a this time.  PT c/o pain and tightness, LBM today

## 2021-05-28 NOTE — ED PROVIDER NOTE - PROGRESS NOTE DETAILS
Surgery team saw pt and recommends admission for SBO. Pt has partial SBO on CT. surgery consulted. Pt advised of results.

## 2021-05-28 NOTE — ED PROVIDER NOTE - ATTENDING CONTRIBUTION TO CARE
Cherise: I performed a face to face bedside interview with patient regarding history of present illness, review of symptoms and past medical history. I completed an independent physical exam.  I have discussed patient's plan of care with advanced care provider.   I agree with note as stated above including HISTORY OF PRESENT ILLNESS, HIV, PAST MEDICAL/SURGICAL/FAMILY/SOCIAL HISTORY, ALLERGIES AND HOME MEDICATIONS, REVIEW OF SYSTEMS, PHYSICAL EXAM, MEDICAL DECISION MAKING and any PROGRESS NOTES during the time I functioned as the attending physician for this patient  unless otherwise noted. My brief assessment is as follows: hx hernia surgeries, obstructions c/o 1-2 days of feeling abd pain/distension, states feels like start of her obstructions. no f/c. no other symptoms. non toxic, rrr, ctab, abd generalized ttp with distension. neuro intact. labs, ct, declining pain meds, reassess

## 2021-05-28 NOTE — ED ADULT NURSE NOTE - PAIN RATING/NUMBER SCALE (0-10): ACTIVITY
----- Message from Michael Cristian MA sent at 7/18/2019  3:24 PM CDT -----  Contact: PATIENT   For review.   ----- Message -----  From: Laila Lackey  Sent: 7/18/2019   3:03 PM  To: [facility] Staff    CALLING REGARDING WHEN WILL SHE BE SCHEDULED FOR INJECTION. PLEASE CALL PATIENT @ [phone]. THANKS, DEVIN     8

## 2021-05-28 NOTE — ED ADULT NURSE NOTE - OBJECTIVE STATEMENT
Pt is here with c/o intermittent abdominal pain since yesterday.  P/s she has a hx of bowel obstruction and states it feels the same but is not constant pain. Pt denies fever, denies N/V.

## 2021-05-28 NOTE — ED ADULT NURSE REASSESSMENT NOTE - NS ED NURSE REASSESS COMMENT FT1
NGT placed by Dr Persaud, hooked up to low suction.  Pt medicated for pain and is resting on stretcher.

## 2021-05-29 ENCOUNTER — TRANSCRIPTION ENCOUNTER (OUTPATIENT)
Age: 66
End: 2021-05-29

## 2021-05-29 LAB
ALBUMIN SERPL ELPH-MCNC: 3.6 G/DL — SIGNIFICANT CHANGE UP (ref 3.3–5.2)
ALP SERPL-CCNC: 65 U/L — SIGNIFICANT CHANGE UP (ref 40–120)
ALT FLD-CCNC: 12 U/L — SIGNIFICANT CHANGE UP
ANION GAP SERPL CALC-SCNC: 8 MMOL/L — SIGNIFICANT CHANGE UP (ref 5–17)
APTT BLD: 34.3 SEC — SIGNIFICANT CHANGE UP (ref 27.5–35.5)
AST SERPL-CCNC: 16 U/L — SIGNIFICANT CHANGE UP
BASOPHILS # BLD AUTO: 0.02 K/UL — SIGNIFICANT CHANGE UP (ref 0–0.2)
BASOPHILS NFR BLD AUTO: 0.3 % — SIGNIFICANT CHANGE UP (ref 0–2)
BILIRUB SERPL-MCNC: 0.4 MG/DL — SIGNIFICANT CHANGE UP (ref 0.4–2)
BUN SERPL-MCNC: 10.5 MG/DL — SIGNIFICANT CHANGE UP (ref 8–20)
CALCIUM SERPL-MCNC: 8.8 MG/DL — SIGNIFICANT CHANGE UP (ref 8.6–10.2)
CHLORIDE SERPL-SCNC: 104 MMOL/L — SIGNIFICANT CHANGE UP (ref 98–107)
CO2 SERPL-SCNC: 27 MMOL/L — SIGNIFICANT CHANGE UP (ref 22–29)
COVID-19 SPIKE DOMAIN AB INTERP: POSITIVE
COVID-19 SPIKE DOMAIN ANTIBODY RESULT: >250 U/ML — HIGH
CREAT SERPL-MCNC: 0.52 MG/DL — SIGNIFICANT CHANGE UP (ref 0.5–1.3)
EOSINOPHIL # BLD AUTO: 0.02 K/UL — SIGNIFICANT CHANGE UP (ref 0–0.5)
EOSINOPHIL NFR BLD AUTO: 0.3 % — SIGNIFICANT CHANGE UP (ref 0–6)
GLUCOSE SERPL-MCNC: 101 MG/DL — HIGH (ref 70–99)
HCT VFR BLD CALC: 37.7 % — SIGNIFICANT CHANGE UP (ref 34.5–45)
HGB BLD-MCNC: 12.2 G/DL — SIGNIFICANT CHANGE UP (ref 11.5–15.5)
IMM GRANULOCYTES NFR BLD AUTO: 0.3 % — SIGNIFICANT CHANGE UP (ref 0–1.5)
INR BLD: 1.15 RATIO — SIGNIFICANT CHANGE UP (ref 0.88–1.16)
LYMPHOCYTES # BLD AUTO: 1.15 K/UL — SIGNIFICANT CHANGE UP (ref 1–3.3)
LYMPHOCYTES # BLD AUTO: 15.5 % — SIGNIFICANT CHANGE UP (ref 13–44)
MAGNESIUM SERPL-MCNC: 2.1 MG/DL — SIGNIFICANT CHANGE UP (ref 1.6–2.6)
MCHC RBC-ENTMCNC: 29.5 PG — SIGNIFICANT CHANGE UP (ref 27–34)
MCHC RBC-ENTMCNC: 32.4 GM/DL — SIGNIFICANT CHANGE UP (ref 32–36)
MCV RBC AUTO: 91.1 FL — SIGNIFICANT CHANGE UP (ref 80–100)
MONOCYTES # BLD AUTO: 0.45 K/UL — SIGNIFICANT CHANGE UP (ref 0–0.9)
MONOCYTES NFR BLD AUTO: 6.1 % — SIGNIFICANT CHANGE UP (ref 2–14)
NEUTROPHILS # BLD AUTO: 5.77 K/UL — SIGNIFICANT CHANGE UP (ref 1.8–7.4)
NEUTROPHILS NFR BLD AUTO: 77.5 % — HIGH (ref 43–77)
PHOSPHATE SERPL-MCNC: 3.3 MG/DL — SIGNIFICANT CHANGE UP (ref 2.4–4.7)
PLATELET # BLD AUTO: 232 K/UL — SIGNIFICANT CHANGE UP (ref 150–400)
POTASSIUM SERPL-MCNC: 4.2 MMOL/L — SIGNIFICANT CHANGE UP (ref 3.5–5.3)
POTASSIUM SERPL-SCNC: 4.2 MMOL/L — SIGNIFICANT CHANGE UP (ref 3.5–5.3)
PROT SERPL-MCNC: 6.6 G/DL — SIGNIFICANT CHANGE UP (ref 6.6–8.7)
PROTHROM AB SERPL-ACNC: 13.2 SEC — SIGNIFICANT CHANGE UP (ref 10.6–13.6)
RBC # BLD: 4.14 M/UL — SIGNIFICANT CHANGE UP (ref 3.8–5.2)
RBC # FLD: 12.3 % — SIGNIFICANT CHANGE UP (ref 10.3–14.5)
SARS-COV-2 IGG+IGM SERPL QL IA: >250 U/ML — HIGH
SARS-COV-2 IGG+IGM SERPL QL IA: POSITIVE
SODIUM SERPL-SCNC: 139 MMOL/L — SIGNIFICANT CHANGE UP (ref 135–145)
WBC # BLD: 7.43 K/UL — SIGNIFICANT CHANGE UP (ref 3.8–10.5)
WBC # FLD AUTO: 7.43 K/UL — SIGNIFICANT CHANGE UP (ref 3.8–10.5)

## 2021-05-29 PROCEDURE — 74018 RADEX ABDOMEN 1 VIEW: CPT | Mod: 26

## 2021-05-29 RX ORDER — ACETAMINOPHEN 500 MG
1000 TABLET ORAL ONCE
Refills: 0 | Status: COMPLETED | OUTPATIENT
Start: 2021-05-29 | End: 2021-05-29

## 2021-05-29 RX ORDER — DIATRIZOATE MEGLUMINE 180 MG/ML
90 INJECTION, SOLUTION INTRAVESICAL ONCE
Refills: 0 | Status: COMPLETED | OUTPATIENT
Start: 2021-05-29 | End: 2021-05-29

## 2021-05-29 RX ORDER — ACETAMINOPHEN 500 MG
650 TABLET ORAL EVERY 6 HOURS
Refills: 0 | Status: DISCONTINUED | OUTPATIENT
Start: 2021-05-29 | End: 2021-05-31

## 2021-05-29 RX ADMIN — DIATRIZOATE MEGLUMINE 90 MILLILITER(S): 180 INJECTION, SOLUTION INTRAVESICAL at 10:41

## 2021-05-29 RX ADMIN — PANTOPRAZOLE SODIUM 40 MILLIGRAM(S): 20 TABLET, DELAYED RELEASE ORAL at 12:19

## 2021-05-29 RX ADMIN — Medication 1000 MILLIGRAM(S): at 03:02

## 2021-05-29 RX ADMIN — Medication 400 MILLIGRAM(S): at 02:47

## 2021-05-29 RX ADMIN — Medication 650 MILLIGRAM(S): at 18:31

## 2021-05-29 RX ADMIN — ENOXAPARIN SODIUM 40 MILLIGRAM(S): 100 INJECTION SUBCUTANEOUS at 21:05

## 2021-05-29 RX ADMIN — ONDANSETRON 4 MILLIGRAM(S): 8 TABLET, FILM COATED ORAL at 02:15

## 2021-05-29 RX ADMIN — SODIUM CHLORIDE 125 MILLILITER(S): 9 INJECTION, SOLUTION INTRAVENOUS at 08:20

## 2021-05-29 RX ADMIN — Medication 1000 MILLIGRAM(S): at 08:50

## 2021-05-29 RX ADMIN — Medication 650 MILLIGRAM(S): at 19:10

## 2021-05-29 RX ADMIN — SODIUM CHLORIDE 125 MILLILITER(S): 9 INJECTION, SOLUTION INTRAVENOUS at 21:05

## 2021-05-29 RX ADMIN — Medication 400 MILLIGRAM(S): at 08:20

## 2021-05-29 NOTE — DISCHARGE NOTE PROVIDER - HOSPITAL COURSE
64yo female with complex surgical history and previous Small Bowel Obstructions admitted with SBO on 5/28/2021 NGT placed with return of bilious fluid.    HD1 gastrograffin challenge initiated with positive response, multiple large liquid bowel movements following and abdominal radiograph with contrast in colon and decompressed small bowel loops.      Diet advanced without incidence.  Tolerating diet, urinating and ambulating on day of discharge.    64yo female with complex surgical history and previous Small Bowel Obstructions admitted with SBO on 5/28/2021 NGT placed with return of bilious fluid.    HD1 gastrograffin challenge initiated with positive response, multiple large liquid bowel movements following and abdominal radiograph with contrast in colon and decompressed small bowel loops.      Diet advanced without incidence.  Tolerating diet, urinating and ambulating on day of discharge.       *****SHOULD PATIENT RETURN TO ED with RECURRENT SYMPTOMS OF OBSTRUCTION--> PAGE/CALL surgical consult upon arrival*****

## 2021-05-29 NOTE — DISCHARGE NOTE PROVIDER - NSDCMRMEDTOKEN_GEN_ALL_CORE_FT
ALBUTEROL HFA INH (200 PUFFS) 6.7GM: INL 2 PFS PO Q 4 H PRN  Dexilant 60 mg oral delayed release capsule: 1 cap(s) orally once a day  DEXILANT 60MG CAP(FORMERLY KAPIDEX): TAKE 1 CAPSULE BY MOUTH DAILY  MELOXICAM  15 MG TABS:   MYRBETRIQ  25 MG TB24:   Viibryd 20 mg oral tablet: 1 tab(s) orally once a day

## 2021-05-29 NOTE — DISCHARGE NOTE PROVIDER - NSDCCPCAREPLAN_GEN_ALL_CORE_FT
PRINCIPAL DISCHARGE DIAGNOSIS  Diagnosis: Partial small bowel obstruction  Assessment and Plan of Treatment: 1) Regular diet as tolerated  2) OK to shower and bath  3) Activity as tolerated   4) Call clinic to make appointment in 2-4 weeks with Dr. Moreno  5) DO NOT DRIVE while taking narcotic pain medication  6) Over the Counter Tylenol and Ibuprofen (with food) for pain  7) Call clinic with concerns regarding persistent fevers, severe pain not relieved with medications, persistent nausea/vomiting       (3) adequate

## 2021-05-29 NOTE — DISCHARGE NOTE PROVIDER - CARE PROVIDER_API CALL
Simone Moreno)  Surgery  [address] [place] [address]  Phone: [phone]  Fax: [phone]  Follow Up Time: 2 weeks

## 2021-05-29 NOTE — DISCHARGE NOTE PROVIDER - NSDCACTIVITY_GEN_ALL_CORE
No restrictions/Return to Work/School allowed/Bathing allowed/Showering allowed/Stairs allowed/Driving allowed/Walking - Indoors allowed/Walking - Outdoors allowed

## 2021-05-30 LAB
ANION GAP SERPL CALC-SCNC: 11 MMOL/L — SIGNIFICANT CHANGE UP (ref 5–17)
BUN SERPL-MCNC: 8.4 MG/DL — SIGNIFICANT CHANGE UP (ref 8–20)
CALCIUM SERPL-MCNC: 9 MG/DL — SIGNIFICANT CHANGE UP (ref 8.6–10.2)
CHLORIDE SERPL-SCNC: 101 MMOL/L — SIGNIFICANT CHANGE UP (ref 98–107)
CO2 SERPL-SCNC: 25 MMOL/L — SIGNIFICANT CHANGE UP (ref 22–29)
CREAT SERPL-MCNC: 0.51 MG/DL — SIGNIFICANT CHANGE UP (ref 0.5–1.3)
GLUCOSE SERPL-MCNC: 86 MG/DL — SIGNIFICANT CHANGE UP (ref 70–99)
MAGNESIUM SERPL-MCNC: 2 MG/DL — SIGNIFICANT CHANGE UP (ref 1.6–2.6)
PHOSPHATE SERPL-MCNC: 3.2 MG/DL — SIGNIFICANT CHANGE UP (ref 2.4–4.7)
POTASSIUM SERPL-MCNC: 3.8 MMOL/L — SIGNIFICANT CHANGE UP (ref 3.5–5.3)
POTASSIUM SERPL-SCNC: 3.8 MMOL/L — SIGNIFICANT CHANGE UP (ref 3.5–5.3)
SODIUM SERPL-SCNC: 137 MMOL/L — SIGNIFICANT CHANGE UP (ref 135–145)

## 2021-05-30 PROCEDURE — 84100 ASSAY OF PHOSPHORUS: CPT

## 2021-05-30 PROCEDURE — 99285 EMERGENCY DEPT VISIT HI MDM: CPT | Mod: 25

## 2021-05-30 PROCEDURE — 83690 ASSAY OF LIPASE: CPT

## 2021-05-30 PROCEDURE — 80048 BASIC METABOLIC PNL TOTAL CA: CPT

## 2021-05-30 PROCEDURE — 93005 ELECTROCARDIOGRAM TRACING: CPT

## 2021-05-30 PROCEDURE — 85610 PROTHROMBIN TIME: CPT

## 2021-05-30 PROCEDURE — 85730 THROMBOPLASTIN TIME PARTIAL: CPT

## 2021-05-30 PROCEDURE — 74018 RADEX ABDOMEN 1 VIEW: CPT

## 2021-05-30 PROCEDURE — 74019 RADEX ABDOMEN 2 VIEWS: CPT

## 2021-05-30 PROCEDURE — 36415 COLL VENOUS BLD VENIPUNCTURE: CPT

## 2021-05-30 PROCEDURE — 71045 X-RAY EXAM CHEST 1 VIEW: CPT

## 2021-05-30 PROCEDURE — 87635 SARS-COV-2 COVID-19 AMP PRB: CPT

## 2021-05-30 PROCEDURE — 85025 COMPLETE CBC W/AUTO DIFF WBC: CPT

## 2021-05-30 PROCEDURE — 83735 ASSAY OF MAGNESIUM: CPT

## 2021-05-30 PROCEDURE — 80053 COMPREHEN METABOLIC PANEL: CPT

## 2021-05-30 PROCEDURE — 86769 SARS-COV-2 COVID-19 ANTIBODY: CPT

## 2021-05-30 PROCEDURE — 74176 CT ABD & PELVIS W/O CONTRAST: CPT

## 2021-05-30 RX ADMIN — Medication 650 MILLIGRAM(S): at 20:43

## 2021-05-30 RX ADMIN — Medication 650 MILLIGRAM(S): at 12:49

## 2021-05-30 RX ADMIN — Medication 650 MILLIGRAM(S): at 13:30

## 2021-05-30 RX ADMIN — ENOXAPARIN SODIUM 40 MILLIGRAM(S): 100 INJECTION SUBCUTANEOUS at 20:43

## 2021-05-30 RX ADMIN — PANTOPRAZOLE SODIUM 40 MILLIGRAM(S): 20 TABLET, DELAYED RELEASE ORAL at 12:46

## 2021-05-30 RX ADMIN — Medication 650 MILLIGRAM(S): at 21:30

## 2021-05-31 ENCOUNTER — TRANSCRIPTION ENCOUNTER (OUTPATIENT)
Age: 66
End: 2021-05-31

## 2021-05-31 VITALS
TEMPERATURE: 98 F | DIASTOLIC BLOOD PRESSURE: 80 MMHG | SYSTOLIC BLOOD PRESSURE: 128 MMHG | RESPIRATION RATE: 17 BRPM | OXYGEN SATURATION: 98 % | HEART RATE: 74 BPM

## 2021-05-31 NOTE — PROGRESS NOTE ADULT - SUBJECTIVE AND OBJECTIVE BOX
INTERVAL HPI/OVERNIGHT EVENTS: NGT removed >24 hrs after successful GGC.  Tolerating regular.  Urinating and ambulating independently.  Passing flatus and liquid to soft bowel movements. Denies fevers, chills, nausea, emesis.  Denies chest pain, dyspnea.  Denies constipation. Denies headaches, dizziness, changes in vision.     MEDICATIONS  (STANDING):  enoxaparin Injectable 40 milliGRAM(s) SubCutaneous at bedtime  lactated ringers. 1000 milliLiter(s) (125 mL/Hr) IV Continuous <Continuous>  pantoprazole  Injectable 40 milliGRAM(s) IV Push daily    MEDICATIONS  (PRN):  acetaminophen   Tablet .. 650 milliGRAM(s) Oral every 6 hours PRN Temp greater or equal to 38C (100.4F), Moderate Pain (4 - 6)  ALBUTerol    90 MICROgram(s) HFA Inhaler 2 Puff(s) Inhalation every 6 hours PRN Shortness of Breath and/or Wheezing  ondansetron Injectable 4 milliGRAM(s) IV Push every 6 hours PRN Nausea      Vital Signs Last 24 Hrs  T(C): 36.7 (29 May 2021 22:44), Max: 37.1 (29 May 2021 11:15)  T(F): 98 (29 May 2021 22:44), Max: 98.8 (29 May 2021 11:15)  HR: 84 (29 May 2021 22:44) (70 - 95)  BP: 145/63 (29 May 2021 22:44) (124/73 - 148/76)  BP(mean): --  RR: 18 (29 May 2021 22:44) (15 - 18)  SpO2: 95% (29 May 2021 22:44) (93% - 98%)    GEN: NAD, laying in bed, appears stated age  HEENT: NCAT, clear oral mucosa, normal conjunctiva  Chest: non-tender  CV:  non-tachycardic, 2+ radial pulse  Pulm: no increased work of breathing, no conversational dyspnea  GI: soft, mildly tender,  non-distended, no rebound or guarding.   MSK: moving all extremities       I&O's Detail    28 May 2021 07:01  -  29 May 2021 07:00  --------------------------------------------------------  IN:  Total IN: 0 mL    OUT:    Nasogastric/Oral tube (mL): 350 mL  Total OUT: 350 mL    Total NET: -350 mL      29 May 2021 07:01  -  30 May 2021 01:31  --------------------------------------------------------  IN:  Total IN: 0 mL    OUT:    Nasogastric/Oral tube (mL): 300 mL  Total OUT: 300 mL    Total NET: -300 mL          LABS:                        12.2   7.43  )-----------( 232      ( 29 May 2021 08:09 )             37.7     05-29    139  |  104  |  10.5  ----------------------------<  101<H>  4.2   |  27.0  |  0.52    Ca    8.8      29 May 2021 08:09  Phos  3.3     05-29  Mg     2.1     05-29    TPro  6.6  /  Alb  3.6  /  TBili  0.4  /  DBili  x   /  AST  16  /  ALT  12  /  AlkPhos  65  05-29    PT/INR - ( 29 May 2021 08:09 )   PT: 13.2 sec;   INR: 1.15 ratio         PTT - ( 29 May 2021 08:09 )  PTT:34.3 sec  
SURGERY DAILY PROGRESS NOTE:    Interval:   NGT inserted    S:   Patient seen and examined. No acute events overnight. Pain is well controlled. Requring tylenol for headache. No stool or flatus. NGT in place with bileous output. Ambulating independently. Voiding spontaneously.       O:   Exam:  Gen: NAD. A&Ox3.  Well developed, alert and cooperative. Complaining of headache  Resp: No additional work of breathing.   Card: RR. No peripheral edema or pallor.   Abd: Soft, ND, NT. No rebound or guarding.   Ext: WWP. Able to move all extremities equally.    Vital Signs Last 24 Hrs  T(C): 37 (29 May 2021 08:41), Max: 37 (29 May 2021 08:41)  T(F): 98.6 (29 May 2021 08:41), Max: 98.6 (29 May 2021 08:41)  HR: 70 (29 May 2021 08:41) (70 - 93)  BP: 125/70 (29 May 2021 08:41) (124/60 - 138/79)  BP(mean): --  RR: 18 (29 May 2021 08:41) (15 - 18)  SpO2: 98% (29 May 2021 08:41) (93% - 98%)      05-28-21 @ 07:01  -  05-29-21 @ 07:00  --------------------------------------------------------  IN: 0 mL / OUT: 350 mL / NET: -350 mL        LABS:                        12.2   7.43  )-----------( 232      ( 29 May 2021 08:09 )             37.7     05-29    139  |  104  |  10.5  ----------------------------<  101<H>  4.2   |  27.0  |  0.52    Ca    8.8      29 May 2021 08:09  Phos  3.3     05-29  Mg     2.1     05-29    TPro  6.6  /  Alb  3.6  /  TBili  0.4  /  DBili  x   /  AST  16  /  ALT  12  /  AlkPhos  65  05-29    PT/INR - ( 29 May 2021 08:09 )   PT: 13.2 sec;   INR: 1.15 ratio         PTT - ( 29 May 2021 08:09 )  PTT:34.3 sec    
INTERVAL HPI/OVERNIGHT EVENTS: NGT removed yesterday after successful GGC.  Tolerating FLD.  Urinating and ambulating independently.  Passing flatus and liquid to soft bowel movements. Denies fevers, chills, nausea, emesis.  Denies chest pain, dyspnea.  Denies constipation. Denies headaches, dizziness, changes in vision.     MEDICATIONS  (STANDING):  enoxaparin Injectable 40 milliGRAM(s) SubCutaneous at bedtime  lactated ringers. 1000 milliLiter(s) (125 mL/Hr) IV Continuous <Continuous>  pantoprazole  Injectable 40 milliGRAM(s) IV Push daily    MEDICATIONS  (PRN):  acetaminophen   Tablet .. 650 milliGRAM(s) Oral every 6 hours PRN Temp greater or equal to 38C (100.4F), Moderate Pain (4 - 6)  ALBUTerol    90 MICROgram(s) HFA Inhaler 2 Puff(s) Inhalation every 6 hours PRN Shortness of Breath and/or Wheezing  ondansetron Injectable 4 milliGRAM(s) IV Push every 6 hours PRN Nausea      Vital Signs Last 24 Hrs  T(C): 36.7 (29 May 2021 22:44), Max: 37.1 (29 May 2021 11:15)  T(F): 98 (29 May 2021 22:44), Max: 98.8 (29 May 2021 11:15)  HR: 84 (29 May 2021 22:44) (70 - 95)  BP: 145/63 (29 May 2021 22:44) (124/73 - 148/76)  BP(mean): --  RR: 18 (29 May 2021 22:44) (15 - 18)  SpO2: 95% (29 May 2021 22:44) (93% - 98%)    GEN: NAD, laying in bed, appears stated age  HEENT: NCAT, clear oral mucosa, normal conjunctiva  Chest: non-tender  CV:  non-tachycardic, 2+ radial pulse  Pulm: no increased work of breathing, no conversational dyspnea  GI: soft, non tender,  non-distended  MSK: moving all extremities       I&O's Detail    28 May 2021 07:01  -  29 May 2021 07:00  --------------------------------------------------------  IN:  Total IN: 0 mL    OUT:    Nasogastric/Oral tube (mL): 350 mL  Total OUT: 350 mL    Total NET: -350 mL      29 May 2021 07:01  -  30 May 2021 01:31  --------------------------------------------------------  IN:  Total IN: 0 mL    OUT:    Nasogastric/Oral tube (mL): 300 mL  Total OUT: 300 mL    Total NET: -300 mL          LABS:                        12.2   7.43  )-----------( 232      ( 29 May 2021 08:09 )             37.7     05-29    139  |  104  |  10.5  ----------------------------<  101<H>  4.2   |  27.0  |  0.52    Ca    8.8      29 May 2021 08:09  Phos  3.3     05-29  Mg     2.1     05-29    TPro  6.6  /  Alb  3.6  /  TBili  0.4  /  DBili  x   /  AST  16  /  ALT  12  /  AlkPhos  65  05-29    PT/INR - ( 29 May 2021 08:09 )   PT: 13.2 sec;   INR: 1.15 ratio         PTT - ( 29 May 2021 08:09 )  PTT:34.3 sec

## 2021-05-31 NOTE — DISCHARGE NOTE NURSING/CASE MANAGEMENT/SOCIAL WORK - PATIENT PORTAL LINK FT
You can access the FollowMyHealth Patient Portal offered by [facility] by registering at the following website: [email or web address]. By joining [employer]’s FollowMyHealth portal, you will also be able to view your health information using other applications (apps) compatible with our system.

## 2021-05-31 NOTE — PROGRESS NOTE ADULT - ASSESSMENT
66yo female with complex surgical history admitted with SBO secondary to adhesive disease.    NGT decompression and gastrograffin challenge successful with Return of bowel function and abdominal radiograph demonstrating contrast throughout colon and resolution of small bowel distension and air fluid levels.    Now tolerating FLD, awaiting regular diet for breakfast.    Likely discharge to home if tolerates breakfast    Regular diet this am  OOB ad mandy  IS  DVT PPx Lovenox while in house  Continue Home medications
66yo female with complex surgical history admitted with SBO secondary to adhesive disease.    NGT decompression and gastrograffin challenge successful with Return of bowel function and abdominal radiograph demonstrating contrast throughout colon and resolution of small bowel distension and air fluid levels.    Now tolerating regular diet, however bowel function is intermittent and patient would like to pass more flatus before discharge.     Likely discharge to home today if stable bowel function    Regular diet  OOB ad mandy  IS  DVT PPx Lovenox while in house  Continue Home medications
63 year old female with extensive abdominal surgery presenting with signs and symptoms of small bowel obstruction. Hemodynamically stable with abdominal exam consistent with an obstruction. no bowel function since yesterday    - Gastrograffin via NGT this am, f/u XR  -NPO/IV fluids  -NGT to continuous low wall suction for proximal decompression  -monitor for bowel function  -serial abdominal exams  -rest of care as per primary team   -surgical team to follow

## 2021-07-30 ENCOUNTER — APPOINTMENT (OUTPATIENT)
Dept: MAMMOGRAPHY | Facility: CLINIC | Age: 66
End: 2021-07-30
Payer: COMMERCIAL

## 2021-07-30 ENCOUNTER — APPOINTMENT (OUTPATIENT)
Dept: ULTRASOUND IMAGING | Facility: CLINIC | Age: 66
End: 2021-07-30
Payer: COMMERCIAL

## 2021-07-30 ENCOUNTER — OUTPATIENT (OUTPATIENT)
Dept: OUTPATIENT SERVICES | Facility: HOSPITAL | Age: 66
LOS: 1 days | End: 2021-07-30
Payer: COMMERCIAL

## 2021-07-30 DIAGNOSIS — Z98.89 OTHER SPECIFIED POSTPROCEDURAL STATES: Chronic | ICD-10-CM

## 2021-07-30 DIAGNOSIS — N63 UNSPECIFIED LUMP IN BREAST: Chronic | ICD-10-CM

## 2021-07-30 DIAGNOSIS — Z00.8 ENCOUNTER FOR OTHER GENERAL EXAMINATION: ICD-10-CM

## 2021-07-30 DIAGNOSIS — Z98.82 BREAST IMPLANT STATUS: Chronic | ICD-10-CM

## 2021-07-30 PROCEDURE — 77065 DX MAMMO INCL CAD UNI: CPT | Mod: 26,LT

## 2021-07-30 PROCEDURE — 76641 ULTRASOUND BREAST COMPLETE: CPT

## 2021-07-30 PROCEDURE — 76641 ULTRASOUND BREAST COMPLETE: CPT | Mod: 26,LT

## 2021-07-30 PROCEDURE — G0279: CPT | Mod: 26

## 2021-07-30 PROCEDURE — 77065 DX MAMMO INCL CAD UNI: CPT

## 2021-07-30 PROCEDURE — G0279: CPT

## 2022-02-01 ENCOUNTER — APPOINTMENT (OUTPATIENT)
Dept: RADIOLOGY | Facility: CLINIC | Age: 67
End: 2022-02-01
Payer: COMMERCIAL

## 2022-02-01 ENCOUNTER — OUTPATIENT (OUTPATIENT)
Dept: OUTPATIENT SERVICES | Facility: HOSPITAL | Age: 67
LOS: 1 days | End: 2022-02-01
Payer: COMMERCIAL

## 2022-02-01 DIAGNOSIS — Z00.00 ENCOUNTER FOR GENERAL ADULT MEDICAL EXAMINATION WITHOUT ABNORMAL FINDINGS: ICD-10-CM

## 2022-02-01 DIAGNOSIS — N63 UNSPECIFIED LUMP IN BREAST: Chronic | ICD-10-CM

## 2022-02-01 DIAGNOSIS — Z98.82 BREAST IMPLANT STATUS: Chronic | ICD-10-CM

## 2022-02-01 DIAGNOSIS — M81.0 AGE-RELATED OSTEOPOROSIS WITHOUT CURRENT PATHOLOGICAL FRACTURE: ICD-10-CM

## 2022-02-01 DIAGNOSIS — Z98.89 OTHER SPECIFIED POSTPROCEDURAL STATES: Chronic | ICD-10-CM

## 2022-02-01 PROCEDURE — 77080 DXA BONE DENSITY AXIAL: CPT

## 2022-02-01 PROCEDURE — 77080 DXA BONE DENSITY AXIAL: CPT | Mod: 26

## 2022-02-21 ENCOUNTER — APPOINTMENT (OUTPATIENT)
Dept: UROGYNECOLOGY | Facility: CLINIC | Age: 67
End: 2022-02-21
Payer: COMMERCIAL

## 2022-02-21 VITALS
SYSTOLIC BLOOD PRESSURE: 128 MMHG | DIASTOLIC BLOOD PRESSURE: 83 MMHG | OXYGEN SATURATION: 98 % | TEMPERATURE: 97 F | HEART RATE: 90 BPM

## 2022-02-21 VITALS
HEART RATE: 90 BPM | TEMPERATURE: 97 F | OXYGEN SATURATION: 98 % | DIASTOLIC BLOOD PRESSURE: 83 MMHG | SYSTOLIC BLOOD PRESSURE: 128 MMHG

## 2022-02-21 PROCEDURE — 99212 OFFICE O/P EST SF 10 MIN: CPT

## 2022-02-21 NOTE — HISTORY OF PRESENT ILLNESS
[FreeTextEntry1] : \par Amanda presents for medication followup. She has hx of frequency and urgency who has been taking Myrbetriq 25mg since June 2020. She was started on this at 06/08/20 visit and insists she has been taking medication since then and possibly getting refills from her PCP. She reports medication has been helping her but has noticed she has more urgency recently. She reports drinking about  32oz of water/day and about 60 oz of coffee/day. PVR and BP normal and she denies any SE.\par \par PVR: 0\par BP: 128/83

## 2022-02-21 NOTE — PHYSICAL EXAM
[No Acute Distress] : in no acute distress [Well developed] : well developed [Well Nourished] : ~L well nourished [Normal Memory] : ~T memory was ~L unimpaired [Oriented x3] : oriented to person, place, and time [Normal Mood/Affect] : mood and affect are normal [Normal Gait] : gait was normal

## 2022-02-21 NOTE — DISCUSSION/SUMMARY
[FreeTextEntry1] : \par We reviewed to incorporate behavioral modifications and continue on Myrbetriq for the next 2 weeks. If behavioral modifications and 25mg dose are helping, she will follow up in 1 year, if she feels symptoms have not improved she will call in 2 weeks and will increase to Myrbetriq 50mg. She verbalizes understanding of plan. Instructed to call with any questions or concerns and she verbalizes understanding.

## 2022-05-11 ENCOUNTER — APPOINTMENT (OUTPATIENT)
Dept: UROGYNECOLOGY | Facility: CLINIC | Age: 67
End: 2022-05-11
Payer: COMMERCIAL

## 2022-05-11 VITALS
WEIGHT: 164 LBS | BODY MASS INDEX: 30.96 KG/M2 | SYSTOLIC BLOOD PRESSURE: 139 MMHG | HEART RATE: 81 BPM | HEIGHT: 61 IN | DIASTOLIC BLOOD PRESSURE: 87 MMHG

## 2022-05-11 VITALS — SYSTOLIC BLOOD PRESSURE: 130 MMHG | DIASTOLIC BLOOD PRESSURE: 78 MMHG

## 2022-05-11 DIAGNOSIS — N39.41 URGE INCONTINENCE: ICD-10-CM

## 2022-05-11 DIAGNOSIS — R39.15 URGENCY OF URINATION: ICD-10-CM

## 2022-05-11 DIAGNOSIS — R35.0 FREQUENCY OF MICTURITION: ICD-10-CM

## 2022-05-11 PROCEDURE — 51798 US URINE CAPACITY MEASURE: CPT

## 2022-05-11 PROCEDURE — 99213 OFFICE O/P EST LOW 20 MIN: CPT

## 2022-05-11 RX ORDER — SOLIFENACIN SUCCINATE 5 MG/1
5 TABLET ORAL
Qty: 30 | Refills: 0 | Status: DISCONTINUED | COMMUNITY
Start: 2018-01-18 | End: 2022-05-11

## 2022-05-11 RX ORDER — MIRABEGRON 50 MG/1
50 TABLET, FILM COATED, EXTENDED RELEASE ORAL
Qty: 90 | Refills: 3 | Status: ACTIVE | COMMUNITY
Start: 2020-06-08 | End: 1900-01-01

## 2022-05-11 NOTE — PHYSICAL EXAM
[No Acute Distress] : in no acute distress [Well developed] : well developed [Well Nourished] : ~L well nourished [Good Hygeine] : demonstrates good hygeine [Oriented x3] : oriented to person, place, and time [Normal Memory] : ~T memory was ~L unimpaired [Normal Mood/Affect] : mood and affect are normal [Warm and Dry] : was warm and dry to touch [Normal Gait] : gait was normal

## 2022-05-11 NOTE — HISTORY OF PRESENT ILLNESS
[FreeTextEntry1] : Pt with hx frequency, urgency, UUI was taking myrbetriq 25mg at last visit 2/21/22.  She was also drinking about 60oz coffee/day at that time.  She didn't think the myrebtriq was working as well.  She was advised to decrease coffee intake to see if that improved symptoms.  She called after 4 weeks of behavioral modifications that she didn't see any improvement and asked to increase myrbetriq to 50mg.  She has been on 50mg since March and reports doing very well with this.  Symptoms are significantly improved.  She denies any SE or subjective feelings of incomplete emptying.  She is very happy with management.

## 2022-05-11 NOTE — DISCUSSION/SUMMARY
[FreeTextEntry1] : PVR normal today.  She is happy on current regimen and eRx was sent.  Per pt, she is retiring in July and possibly moving to [place].  Notes that with her new insurance, myrebtriq would cost >$400/month.  We discussed PTNS and IUGA information was given to her.  She is not sure she could commit to that as she would be leaving [place].  She will continue myrbetriq for now and find a new provided once she gets to [place].  Instructed to call with any questions or concerns and she verbalizes understanding.

## 2022-07-20 ENCOUNTER — OUTPATIENT (OUTPATIENT)
Dept: OUTPATIENT SERVICES | Facility: HOSPITAL | Age: 67
LOS: 1 days | End: 2022-07-20
Payer: MEDICARE

## 2022-07-20 ENCOUNTER — APPOINTMENT (OUTPATIENT)
Dept: MAMMOGRAPHY | Facility: CLINIC | Age: 67
End: 2022-07-20

## 2022-07-20 ENCOUNTER — APPOINTMENT (OUTPATIENT)
Dept: ULTRASOUND IMAGING | Facility: CLINIC | Age: 67
End: 2022-07-20

## 2022-07-20 DIAGNOSIS — Z00.00 ENCOUNTER FOR GENERAL ADULT MEDICAL EXAMINATION WITHOUT ABNORMAL FINDINGS: ICD-10-CM

## 2022-07-20 DIAGNOSIS — Z98.82 BREAST IMPLANT STATUS: Chronic | ICD-10-CM

## 2022-07-20 DIAGNOSIS — N63 UNSPECIFIED LUMP IN BREAST: Chronic | ICD-10-CM

## 2022-07-20 DIAGNOSIS — Z98.89 OTHER SPECIFIED POSTPROCEDURAL STATES: Chronic | ICD-10-CM

## 2022-07-20 PROCEDURE — 76641 ULTRASOUND BREAST COMPLETE: CPT

## 2022-07-20 PROCEDURE — 77063 BREAST TOMOSYNTHESIS BI: CPT | Mod: 26,52

## 2022-07-20 PROCEDURE — 77063 BREAST TOMOSYNTHESIS BI: CPT

## 2022-07-20 PROCEDURE — 77067 SCR MAMMO BI INCL CAD: CPT

## 2022-07-20 PROCEDURE — 76641 ULTRASOUND BREAST COMPLETE: CPT | Mod: 26,LT

## 2022-07-20 PROCEDURE — 77067 SCR MAMMO BI INCL CAD: CPT | Mod: 26,LT,52

## 2023-06-01 ENCOUNTER — ESTABLISHED PATIENT (OUTPATIENT)
Dept: URBAN - METROPOLITAN AREA CLINIC 6 | Facility: CLINIC | Age: 68
End: 2023-06-01

## 2023-06-01 DIAGNOSIS — H02.831: ICD-10-CM

## 2023-06-01 DIAGNOSIS — H02.834: ICD-10-CM

## 2023-06-01 DIAGNOSIS — Z96.1: ICD-10-CM

## 2023-06-01 PROCEDURE — 92004 COMPRE OPH EXAM NEW PT 1/>: CPT

## 2023-06-01 PROCEDURE — 92015 DETERMINE REFRACTIVE STATE: CPT

## 2023-06-01 ASSESSMENT — TONOMETRY
OS_IOP_MMHG: 9
OD_IOP_MMHG: 8

## 2023-06-01 ASSESSMENT — VISUAL ACUITY
OU_SC: 20/30
OD_GLARE: 20/40
OS_SC: 20/40
OU_GLARE: 20/40
OS_GLARE: 20/40
OD_SC: 20/40

## 2024-12-09 NOTE — ED ADULT NURSE NOTE - NSIMPLEMENTINTERV_GEN_ALL_ED
Implemented All Universal Safety Interventions:  [place] to call system. Call bell, personal items and telephone within reach. Instruct patient to call for assistance. Room bathroom lighting operational. Non-slip footwear when patient is off stretcher. Physically safe environment: no spills, clutter or unnecessary equipment. Stretcher in lowest position, wheels locked, appropriate side rails in place. No

## 2025-06-25 NOTE — H&P PST ADULT - PROBLEM/PLAN-2
Post-Care Instructions: I reviewed with the patient in detail post-care instructions. Patient is to wear sunprotection, and avoid picking at any of the treated lesions. Pt may apply Vaseline to crusted or scabbing areas. Detail Level: Detailed Render Post-Care Instructions In Note?: yes Render Note In Bullet Format When Appropriate: No Duration Of Freeze Thaw-Cycle (Seconds): 5 Number Of Freeze-Thaw Cycles: 1 freeze-thaw cycle Consent: The patient's consent was obtained including but not limited to risks of crusting, scabbing, blistering, scarring, darker or lighter pigmentary change, recurrence, incomplete removal and infection. DISPLAY PLAN FREE TEXT

## 2025-08-04 ENCOUNTER — COMPREHENSIVE EXAM (OUTPATIENT)
Age: 70
End: 2025-08-04

## 2025-08-04 DIAGNOSIS — H04.123: ICD-10-CM

## 2025-08-04 DIAGNOSIS — H35.3111: ICD-10-CM

## 2025-08-04 DIAGNOSIS — H02.831: ICD-10-CM

## 2025-08-04 DIAGNOSIS — H26.493: ICD-10-CM

## 2025-08-04 DIAGNOSIS — H02.834: ICD-10-CM

## 2025-08-04 PROCEDURE — 92134 CPTRZ OPH DX IMG PST SGM RTA: CPT

## 2025-08-04 PROCEDURE — 92014 COMPRE OPH EXAM EST PT 1/>: CPT

## 2025-08-04 PROCEDURE — 92015 DETERMINE REFRACTIVE STATE: CPT

## 2025-08-25 ENCOUNTER — COMPREHENSIVE EXAM (OUTPATIENT)
Age: 70
End: 2025-08-25

## 2025-08-25 DIAGNOSIS — H43.823: ICD-10-CM

## 2025-08-25 DIAGNOSIS — Z96.1: ICD-10-CM

## 2025-08-25 DIAGNOSIS — H43.813: ICD-10-CM

## 2025-08-25 DIAGNOSIS — H04.123: ICD-10-CM

## 2025-08-25 DIAGNOSIS — H35.363: ICD-10-CM

## 2025-08-25 DIAGNOSIS — H26.492: ICD-10-CM

## 2025-08-25 PROCEDURE — 99214 OFFICE O/P EST MOD 30 MIN: CPT

## 2025-08-25 PROCEDURE — 92134 CPTRZ OPH DX IMG PST SGM RTA: CPT
